# Patient Record
Sex: MALE | Race: WHITE | NOT HISPANIC OR LATINO | Employment: UNEMPLOYED | URBAN - METROPOLITAN AREA
[De-identification: names, ages, dates, MRNs, and addresses within clinical notes are randomized per-mention and may not be internally consistent; named-entity substitution may affect disease eponyms.]

---

## 2017-02-25 ENCOUNTER — HOSPITAL ENCOUNTER (EMERGENCY)
Facility: HOSPITAL | Age: 32
Discharge: HOME | End: 2017-02-25
Attending: EMERGENCY MEDICINE | Admitting: EMERGENCY MEDICINE

## 2017-02-25 VITALS
OXYGEN SATURATION: 98 % | HEART RATE: 87 BPM | RESPIRATION RATE: 16 BRPM | SYSTOLIC BLOOD PRESSURE: 149 MMHG | DIASTOLIC BLOOD PRESSURE: 95 MMHG | BODY MASS INDEX: 28.49 KG/M2 | TEMPERATURE: 99.1 F | WEIGHT: 215 LBS | HEIGHT: 73 IN

## 2017-02-25 DIAGNOSIS — L02.416 ABSCESS OF LEFT LEG: Primary | ICD-10-CM

## 2017-02-25 PROCEDURE — 87205 SMEAR GRAM STAIN: CPT | Performed by: EMERGENCY MEDICINE

## 2017-02-25 PROCEDURE — 87070 CULTURE OTHR SPECIMN AEROBIC: CPT | Performed by: EMERGENCY MEDICINE

## 2017-02-25 PROCEDURE — 99283 EMERGENCY DEPT VISIT LOW MDM: CPT

## 2017-02-25 PROCEDURE — 25010000002 ONDANSETRON PER 1 MG: Performed by: EMERGENCY MEDICINE

## 2017-02-25 PROCEDURE — 25010000002 HYDROMORPHONE PER 4 MG: Performed by: EMERGENCY MEDICINE

## 2017-02-25 PROCEDURE — 96374 THER/PROPH/DIAG INJ IV PUSH: CPT

## 2017-02-25 PROCEDURE — 87186 SC STD MICRODIL/AGAR DIL: CPT | Performed by: EMERGENCY MEDICINE

## 2017-02-25 PROCEDURE — 87147 CULTURE TYPE IMMUNOLOGIC: CPT | Performed by: EMERGENCY MEDICINE

## 2017-02-25 PROCEDURE — 96375 TX/PRO/DX INJ NEW DRUG ADDON: CPT

## 2017-02-25 RX ORDER — ONDANSETRON 2 MG/ML
4 INJECTION INTRAMUSCULAR; INTRAVENOUS ONCE
Status: COMPLETED | OUTPATIENT
Start: 2017-02-25 | End: 2017-02-25

## 2017-02-25 RX ORDER — SODIUM CHLORIDE 0.9 % (FLUSH) 0.9 %
10 SYRINGE (ML) INJECTION AS NEEDED
Status: DISCONTINUED | OUTPATIENT
Start: 2017-02-25 | End: 2017-02-25 | Stop reason: HOSPADM

## 2017-02-25 RX ORDER — SULFAMETHOXAZOLE AND TRIMETHOPRIM 800; 160 MG/1; MG/1
1 TABLET ORAL 2 TIMES DAILY
Qty: 14 TABLET | Refills: 0 | Status: SHIPPED | OUTPATIENT
Start: 2017-02-25 | End: 2017-03-08

## 2017-02-25 RX ORDER — HYDROCODONE BITARTRATE AND ACETAMINOPHEN 5; 325 MG/1; MG/1
1 TABLET ORAL EVERY 6 HOURS PRN
Qty: 15 TABLET | Refills: 0 | Status: SHIPPED | OUTPATIENT
Start: 2017-02-25 | End: 2017-03-08

## 2017-02-25 RX ADMIN — ONDANSETRON 4 MG: 2 INJECTION INTRAMUSCULAR; INTRAVENOUS at 16:27

## 2017-02-25 RX ADMIN — HYDROMORPHONE HYDROCHLORIDE 1 MG: 1 INJECTION, SOLUTION INTRAMUSCULAR; INTRAVENOUS; SUBCUTANEOUS at 16:30

## 2017-02-27 LAB
BACTERIA SPEC AEROBE CULT: ABNORMAL
GRAM STN SPEC: ABNORMAL
GRAM STN SPEC: ABNORMAL

## 2017-02-28 ENCOUNTER — HOSPITAL ENCOUNTER (EMERGENCY)
Facility: HOSPITAL | Age: 32
Discharge: HOME | End: 2017-02-28
Attending: EMERGENCY MEDICINE | Admitting: EMERGENCY MEDICINE

## 2017-02-28 VITALS
DIASTOLIC BLOOD PRESSURE: 86 MMHG | SYSTOLIC BLOOD PRESSURE: 145 MMHG | OXYGEN SATURATION: 96 % | HEIGHT: 73 IN | RESPIRATION RATE: 15 BRPM | TEMPERATURE: 98.3 F | WEIGHT: 215 LBS | HEART RATE: 78 BPM | BODY MASS INDEX: 28.49 KG/M2

## 2017-02-28 DIAGNOSIS — Z48.00 ABSCESS PACKING REMOVAL: ICD-10-CM

## 2017-02-28 DIAGNOSIS — Z51.89 WOUND CHECK, ABSCESS: ICD-10-CM

## 2017-02-28 DIAGNOSIS — L02.416 ABSCESS OF LEFT THIGH: Primary | ICD-10-CM

## 2017-02-28 PROCEDURE — 99283 EMERGENCY DEPT VISIT LOW MDM: CPT

## 2017-02-28 RX ORDER — IBUPROFEN 600 MG/1
600 TABLET ORAL EVERY 6 HOURS PRN
Qty: 24 TABLET | Refills: 0 | Status: SHIPPED | OUTPATIENT
Start: 2017-02-28 | End: 2017-03-01

## 2017-03-01 ENCOUNTER — OFFICE VISIT (OUTPATIENT)
Dept: SURGERY | Facility: CLINIC | Age: 32
End: 2017-03-01

## 2017-03-01 VITALS — HEART RATE: 97 BPM | WEIGHT: 215 LBS | OXYGEN SATURATION: 98 % | BODY MASS INDEX: 28.49 KG/M2 | HEIGHT: 73 IN

## 2017-03-01 DIAGNOSIS — L02.416 ABSCESS OF LEFT THIGH: Primary | ICD-10-CM

## 2017-03-01 PROCEDURE — 10060 I&D ABSCESS SIMPLE/SINGLE: CPT | Performed by: SURGERY

## 2017-03-01 NOTE — PROGRESS NOTES
Chief complaint: Left leg pain and swelling    History presenting illness: This nice 31-year-old gentleman noticed about 6 days ago some pain and swelling and erythema on his lateral aspect of his left thigh.  He presented to emergency room and fairly had an incision and drainage performed.  However over the last 34 days it hasn't really gotten better and he still has significant erythema and tenderness and so comes today for further evaluation.    Physical exam:  There is a small open incision which is healing nicely on the lateral aspect of his left thigh.  However just above this there is an area of induration and swelling and tenderness which is approximately 5 x 4 cm and although I cannot feel any distinct fluctuance and appears to me that there is likely to be an undrained space above it.    Assessment and plan abscess left thigh, incompletely drained.  I recommended further incision and drainage to the patient here in the office and he agreed to proceed.      Procedure note:    Preoperative diagnosis:  Left thigh abscess    Postoperative diagnosis: Same    Procedure performed: Incision and drainage of simple abscess on left thigh    Surgeon: Kamlesh Maldonado M.D.    Anesthesia: Local    Description of procedure:  After obtaining informed consent, patient brought to the procedure room and his leg was sterilely prepped and draped.  I infiltrated the area with a mixture of lidocaine and Marcaine and then used a #11 blade to incise through the area of maximal induration and immediately got some pus back.  I extended this incision all the way down to the area of his previous incision for a space about 2 cm giving him a total incision length of around 3 cm.  Some pus did come out.  I have no doubt that it was the same MRSA that he grew before and so a new culture was not sent.  I irrigated the wound with some peroxide and then dressed with a wet-to-dry normal saline dressing.  I told him he should continue his  antibiotics and follow up with me in the office next week.    Kamlesh Maldonado MD  General and Endoscopic Surgery  [facility]    [address], [address]  [place], [place], [address]  P: [phone]  F: [phone]

## 2017-03-08 ENCOUNTER — OFFICE VISIT (OUTPATIENT)
Dept: SURGERY | Facility: CLINIC | Age: 32
End: 2017-03-08

## 2017-03-08 DIAGNOSIS — L02.416 CUTANEOUS ABSCESS OF LEFT LOWER EXTREMITY: Primary | ICD-10-CM

## 2017-03-08 PROCEDURE — 99024 POSTOP FOLLOW-UP VISIT: CPT | Performed by: SURGERY

## 2017-03-08 NOTE — PROGRESS NOTES
Follow-up incision and drainage of left thigh abscess    Subjective:  Patient has minimal pain in this and overall feels better.    Objective:  Wound is examined.  No further erythema.  It has been drained nicely and there is beginnings of some nice granulation tissue.  No signs of any remaining infection.    Assessment and plan:  Status post drainage of anterior thigh abscess.  Patient has completed his antibiotics.  I've asked him to continue with twice daily normal saline dressing changes until nothing to be packed in the wound, and at that time I think that daily dry dressings over the wound until it is scabbed over nicely would be appropriate.  No indication for any further antibiotics.  Follow up with me as needed.    Kamlesh Maldonado MD  General and Endoscopic Surgery  [facility]    [address], [address]  [place], [place], [address]  P: [phone]  F: [phone]

## 2017-03-13 ENCOUNTER — OFFICE VISIT (OUTPATIENT)
Dept: SURGERY | Facility: CLINIC | Age: 32
End: 2017-03-13

## 2017-03-13 DIAGNOSIS — L02.416 CUTANEOUS ABSCESS OF LEFT LOWER EXTREMITY: Primary | ICD-10-CM

## 2017-03-13 PROCEDURE — 99024 POSTOP FOLLOW-UP VISIT: CPT | Performed by: SURGERY

## 2017-03-13 NOTE — PROGRESS NOTES
Follow-up anterior thigh wound    Subjective:  Patient says he is at work and began to have some more swelling and sensation of fever.  He took some photos of the wound which showed what looked like some fibrinous debris as well.    Objective:  There is no cellulitis on his leg.  The wound bed is quite clean.    Assessment and plan:  Cutaneous abscess of the thigh status post drainage.  I recommended that he continue with twice daily peroxide dressing changes.  He may follow-up as needed.    Kamlesh Maldonado MD  General and Endoscopic Surgery  [facility]    [address] [place] [address], [address]  [place], [place], [address]  P: [phone]  F: [phone]

## 2017-06-09 ENCOUNTER — HOSPITAL ENCOUNTER (EMERGENCY)
Facility: HOSPITAL | Age: 32
Discharge: HOME | End: 2017-06-09
Attending: EMERGENCY MEDICINE | Admitting: EMERGENCY MEDICINE

## 2017-06-09 ENCOUNTER — APPOINTMENT (OUTPATIENT)
Dept: GENERAL RADIOLOGY | Facility: HOSPITAL | Age: 32
End: 2017-06-09

## 2017-06-09 VITALS
TEMPERATURE: 97.7 F | RESPIRATION RATE: 16 BRPM | OXYGEN SATURATION: 98 % | DIASTOLIC BLOOD PRESSURE: 92 MMHG | BODY MASS INDEX: 29.16 KG/M2 | HEIGHT: 73 IN | HEART RATE: 65 BPM | SYSTOLIC BLOOD PRESSURE: 141 MMHG | WEIGHT: 220 LBS

## 2017-06-09 DIAGNOSIS — S63.502A WRIST SPRAIN, LEFT, INITIAL ENCOUNTER: ICD-10-CM

## 2017-06-09 DIAGNOSIS — S62.367A CLOSED NONDISPLACED FRACTURE OF NECK OF FIFTH METACARPAL BONE OF LEFT HAND, INITIAL ENCOUNTER: Primary | ICD-10-CM

## 2017-06-09 PROCEDURE — 73110 X-RAY EXAM OF WRIST: CPT

## 2017-06-09 PROCEDURE — 73130 X-RAY EXAM OF HAND: CPT

## 2017-06-09 PROCEDURE — 99282 EMERGENCY DEPT VISIT SF MDM: CPT

## 2017-06-09 RX ORDER — HYDROCODONE BITARTRATE AND ACETAMINOPHEN 5; 325 MG/1; MG/1
1 TABLET ORAL EVERY 6 HOURS PRN
Qty: 16 TABLET | Refills: 0 | Status: SHIPPED | OUTPATIENT
Start: 2017-06-09 | End: 2018-09-04

## 2017-06-09 NOTE — ED NOTES
"Pt fell \"[facility]\" and c/o right wrist pain that radiates into right elbow.      Keyanna Hagan RN  06/09/17 1007    "

## 2017-06-09 NOTE — DISCHARGE INSTRUCTIONS
PLEASE READ AND REVIEW ALL DISCHARGE INSTRUCTIONS.     Please follow up with your primary care physician for any further evaluation/treatment and further management of your blood pressure.     Recheck in emergency department for any worsening and/or concerning symptoms.     Take all prescribed medicine as written and continue chronic medication.    Keep splint in place until follow up with Dr. Gallego.  Call today to schedule appointment.     Take pain medicine as needed.    DO NOT DRIVE WHILE TAKING PAIN MEDICINE.

## 2017-06-09 NOTE — ED TRIAGE NOTES
Pt fell off back porch memorial day and still continues to have right wrist to elbow to upper arm pain

## 2017-06-09 NOTE — ED PROVIDER NOTES
Pt presents to the ED complaining of right hand and wrist pain s/p mechanical fall 1.5 weeks ago. On exam, the pt has tenderness to palpation along the lateral right hand. I agree with the plan to order R hand XR and R wrist XR prior to disposition.    I supervised care provided by the midlevel provider.    We have discussed this patient's history, physical exam, and treatment plan.   I have reviewed the note and personally saw and examined the patient and agree with the plan of care.    Documentation assistance provided by eren Bullock for Dr. Squires.  Information recorded by the nuhae was done at my direction and has been verified and validated by me.       Ignacia Bullock  06/09/17 [id number]       Martín Squires MD  06/09/17 7926

## 2018-09-04 ENCOUNTER — HOSPITAL ENCOUNTER (EMERGENCY)
Facility: HOSPITAL | Age: 33
Discharge: HOME | End: 2018-09-04
Attending: EMERGENCY MEDICINE | Admitting: EMERGENCY MEDICINE

## 2018-09-04 VITALS
DIASTOLIC BLOOD PRESSURE: 96 MMHG | TEMPERATURE: 98.6 F | BODY MASS INDEX: 28.49 KG/M2 | RESPIRATION RATE: 18 BRPM | HEIGHT: 73 IN | HEART RATE: 69 BPM | SYSTOLIC BLOOD PRESSURE: 138 MMHG | WEIGHT: 215 LBS | OXYGEN SATURATION: 99 %

## 2018-09-04 DIAGNOSIS — H61.22 IMPACTED CERUMEN OF LEFT EAR: Primary | ICD-10-CM

## 2018-09-04 PROCEDURE — 99283 EMERGENCY DEPT VISIT LOW MDM: CPT

## 2018-09-04 NOTE — ED PROVIDER NOTES
" EMERGENCY DEPARTMENT ENCOUNTER    CHIEF COMPLAINT  Chief Complaint: L ear pain   History given by: Pt  History limited by: None  Room Number: 33/33  PMD: Provider, No Known      HPI:  Pt is a 32 y.o. male who presents complaining of \"throbbing\" L ear pain and dizziness. Pt states that 6 months ago he had a hearing test and a nurse \"saw something in his ear\". Pt has tried using hydrogen peroxide in ear with no relief. Pt denies fever, chills, cough, sore throat, nausea, and vomiting.     Duration:  Worsening over 6 months  Onset: gradual  Timing: constant  Location: L ear  Quality: \"throbbing\" pain  Intensity/Severity: moderate  Progression: worsening  Associated Symptoms: dizziness  Aggravating Factors: none stated  Alleviating Factors: none  Previous Episodes: none  Treatment before arrival: Pt has used hydrogen peroxide with no relief.     PAST MEDICAL HISTORY  Active Ambulatory Problems     Diagnosis Date Noted   • No Active Ambulatory Problems     Resolved Ambulatory Problems     Diagnosis Date Noted   • No Resolved Ambulatory Problems     No Additional Past Medical History       PAST SURGICAL HISTORY  Past Surgical History:   Procedure Laterality Date   • ABSCESS DRAINAGE Left 03/01/2017    Incision and drainage of simple abscess on left thigh-Dr. Kamlesh Maldonado       FAMILY HISTORY  History reviewed. No pertinent family history.    SOCIAL HISTORY  Social History     Social History   • Marital status: Single     Spouse name: N/A   • Number of children: N/A   • Years of education: N/A     Occupational History   • Not on file.     Social History Main Topics   • Smoking status: Current Every Day Smoker     Packs/day: 0.50     Years: 1.00   • Smokeless tobacco: Not on file   • Alcohol use No   • Drug use: No   • Sexual activity: Defer     Other Topics Concern   • Not on file     Social History Narrative   • No narrative on file       ALLERGIES  Patient has no known allergies.    REVIEW OF SYSTEMS  Review of " Systems   Constitutional: Negative for activity change, appetite change, chills and fever.   HENT: Positive for ear pain (L). Negative for congestion and sore throat.    Eyes: Negative.    Respiratory: Negative for cough and shortness of breath.    Cardiovascular: Negative for chest pain and leg swelling.   Gastrointestinal: Negative for abdominal pain, diarrhea, nausea and vomiting.   Endocrine: Negative.    Genitourinary: Negative for decreased urine volume and dysuria.   Musculoskeletal: Negative for neck pain.   Skin: Negative for rash and wound.   Allergic/Immunologic: Negative.    Neurological: Positive for dizziness. Negative for weakness, numbness and headaches.   Hematological: Negative.    Psychiatric/Behavioral: Negative.    All other systems reviewed and are negative.      PHYSICAL EXAM  ED Triage Vitals   Temp Heart Rate Resp BP SpO2   09/04/18 1040 09/04/18 1040 09/04/18 1123 09/04/18 1123 09/04/18 1040   98.6 °F (37 °C) 78 16 130/83 98 %      Temp src Heart Rate Source Patient Position BP Location FiO2 (%)   09/04/18 1040 09/04/18 1123 -- 09/04/18 1123 --   Tympanic Monitor  Right arm        Physical Exam   Constitutional: No distress.   HENT:   Head: Normocephalic and atraumatic.   Right Ear: Tympanic membrane and ear canal normal.   Mouth/Throat: Oropharynx is clear and moist.   Wax plug covering L TM   Eyes:   Unremarkable   Neck: Normal range of motion. Neck supple.   No lymphadenopathy    Cardiovascular: Normal rate and regular rhythm.    Pulmonary/Chest: Breath sounds normal. No respiratory distress.   Abdominal: There is no tenderness.   Musculoskeletal: He exhibits no edema or tenderness.   Neurological: He is alert.   Skin: No rash noted.   Nursing note and vitals reviewed.      PROCEDURES  Procedures      PROGRESS AND CONSULTS   12:08 PM  Ear wax removal and Debrox ordered for wax plug removal.     1:39 PM  Rechecked pt who is resting in NAD. Attempted Debrox and warm water irrigation  without success. Discussed plan to discharge with referral to ENT. Pt understands and agrees with the plan, all questions answered.      MEDICAL DECISION MAKING  Results were reviewed/discussed with the patient and they were also made aware of online access. Pt also made aware that some labs, such as cultures, will not be resulted during ER visit and follow up with PMD is necessary.     MDM       DIAGNOSIS  Final diagnoses:   Impacted cerumen of left ear       DISPOSITION  DISCHARGE    Patient discharged in stable condition.    Reviewed implications of results, diagnosis, meds, responsibility to follow up, warning signs and symptoms of possible worsening, potential complications and reasons to return to ER.    Patient/Family voiced understanding of above instructions.    Discussed plan for discharge, as there is no emergent indication for admission. Patient referred to primary care provider for BP management due to today's BP. Pt/family is agreeable and understands need for follow up and repeat testing.  Pt is aware that discharge does not mean that nothing is wrong but it indicates no emergency is present that requires admission and they must continue care with follow-up as given below or physician of their choice.     FOLLOW-UP  Jermaine Yao MD  [address] [address]  [phone]    Schedule an appointment as soon as possible for a visit            Medication List      Stop    HYDROcodone-acetaminophen 5-325 MG per tablet  Commonly known as:  NORCO              Latest Documented Vital Signs:  As of 1:53 PM  BP- 138/91 HR- 70 Temp- 98.6 °F (37 °C) (Tympanic) O2 sat- 97%    --  Documentation assistance provided by eren Koenig for Dr. Doss.  Information recorded by the scrmatthew was done at my direction and has been verified and validated by me.         Crista Koenig  09/04/18 5633       Parish Doss MD  09/04/18 5228

## 2018-09-21 ENCOUNTER — OFFICE VISIT CONVERTED (OUTPATIENT)
Dept: FAMILY MEDICINE CLINIC | Age: 33
End: 2018-09-21
Attending: NURSE PRACTITIONER

## 2019-03-19 ENCOUNTER — HOSPITAL ENCOUNTER (OUTPATIENT)
Dept: OTHER | Facility: HOSPITAL | Age: 34
Discharge: HOME | End: 2019-03-19
Attending: NURSE PRACTITIONER

## 2019-03-19 ENCOUNTER — OFFICE VISIT CONVERTED (OUTPATIENT)
Dept: FAMILY MEDICINE CLINIC | Age: 34
End: 2019-03-19
Attending: NURSE PRACTITIONER

## 2019-03-19 LAB
ALBUMIN SERPL-MCNC: 4.4 G/DL (ref 3.5–5)
ALBUMIN/GLOB SERPL: 1.2 {RATIO} (ref 1.4–2.6)
ALP SERPL-CCNC: 79 U/L (ref 53–128)
ALT SERPL-CCNC: 95 U/L (ref 10–40)
ANION GAP SERPL CALC-SCNC: 17 MMOL/L (ref 8–19)
AST SERPL-CCNC: 48 U/L (ref 15–50)
BILIRUB SERPL-MCNC: 0.27 MG/DL (ref 0.2–1.3)
BUN SERPL-MCNC: 14 MG/DL (ref 5–25)
BUN/CREAT SERPL: 15 {RATIO} (ref 6–20)
CALCIUM SERPL-MCNC: 9.4 MG/DL (ref 8.7–10.4)
CHLORIDE SERPL-SCNC: 101 MMOL/L (ref 99–111)
CONV CO2: 24 MMOL/L (ref 22–32)
CONV TOTAL PROTEIN: 8 G/DL (ref 6.3–8.2)
CREAT UR-MCNC: 0.94 MG/DL (ref 0.7–1.2)
GFR SERPLBLD BASED ON 1.73 SQ M-ARVRAT: >60 ML/MIN/{1.73_M2}
GLOBULIN UR ELPH-MCNC: 3.6 G/DL (ref 2–3.5)
GLUCOSE SERPL-MCNC: 101 MG/DL (ref 70–99)
OSMOLALITY SERPL CALC.SUM OF ELEC: 287 MOSM/KG (ref 273–304)
POTASSIUM SERPL-SCNC: 4.2 MMOL/L (ref 3.5–5.3)
SODIUM SERPL-SCNC: 138 MMOL/L (ref 135–147)

## 2019-03-22 ENCOUNTER — OFFICE VISIT CONVERTED (OUTPATIENT)
Dept: PODIATRY | Facility: CLINIC | Age: 34
End: 2019-03-22
Attending: PODIATRIST

## 2019-03-28 ENCOUNTER — HOSPITAL ENCOUNTER (OUTPATIENT)
Dept: OTHER | Facility: HOSPITAL | Age: 34
Discharge: HOME | End: 2019-03-28
Attending: NURSE PRACTITIONER

## 2019-04-04 ENCOUNTER — OFFICE VISIT CONVERTED (OUTPATIENT)
Dept: PODIATRY | Facility: CLINIC | Age: 34
End: 2019-04-04
Attending: PODIATRIST

## 2019-10-23 ENCOUNTER — OFFICE VISIT CONVERTED (OUTPATIENT)
Dept: FAMILY MEDICINE CLINIC | Age: 34
End: 2019-10-23
Attending: NURSE PRACTITIONER

## 2019-11-26 ENCOUNTER — HOSPITAL ENCOUNTER (OUTPATIENT)
Dept: OTHER | Facility: HOSPITAL | Age: 34
Discharge: HOME | End: 2019-11-26
Attending: NURSE PRACTITIONER

## 2019-11-26 ENCOUNTER — OFFICE VISIT CONVERTED (OUTPATIENT)
Dept: FAMILY MEDICINE CLINIC | Age: 34
End: 2019-11-26
Attending: NURSE PRACTITIONER

## 2019-12-06 ENCOUNTER — HOSPITAL ENCOUNTER (OUTPATIENT)
Dept: OTHER | Facility: HOSPITAL | Age: 34
Discharge: HOME | End: 2019-12-06
Attending: NURSE PRACTITIONER

## 2019-12-20 ENCOUNTER — OFFICE VISIT (OUTPATIENT)
Dept: ORTHOPEDIC SURGERY | Facility: CLINIC | Age: 34
End: 2019-12-20

## 2019-12-20 VITALS — HEIGHT: 73 IN | TEMPERATURE: 97.9 F | BODY MASS INDEX: 30.48 KG/M2 | WEIGHT: 230 LBS

## 2019-12-20 DIAGNOSIS — M25.511 ACUTE PAIN OF RIGHT SHOULDER: Primary | ICD-10-CM

## 2019-12-20 PROCEDURE — 99203 OFFICE O/P NEW LOW 30 MIN: CPT | Performed by: ORTHOPAEDIC SURGERY

## 2019-12-20 PROCEDURE — 20610 DRAIN/INJ JOINT/BURSA W/O US: CPT | Performed by: ORTHOPAEDIC SURGERY

## 2019-12-20 PROCEDURE — 73030 X-RAY EXAM OF SHOULDER: CPT | Performed by: ORTHOPAEDIC SURGERY

## 2019-12-20 RX ORDER — IBUPROFEN 200 MG
200 TABLET ORAL EVERY 6 HOURS PRN
COMMUNITY
End: 2021-08-27

## 2019-12-20 RX ORDER — CYCLOBENZAPRINE HCL 5 MG
5 TABLET ORAL 3 TIMES DAILY PRN
COMMUNITY
End: 2021-08-27

## 2019-12-20 RX ADMIN — METHYLPREDNISOLONE ACETATE 160 MG: 80 INJECTION, SUSPENSION INTRA-ARTICULAR; INTRALESIONAL; INTRAMUSCULAR; SOFT TISSUE at 09:35
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
## 2019-12-24 RX ORDER — METHYLPREDNISOLONE ACETATE 80 MG/ML
160 INJECTION, SUSPENSION INTRA-ARTICULAR; INTRALESIONAL; INTRAMUSCULAR; SOFT TISSUE
Status: COMPLETED | OUTPATIENT
Start: 2019-12-20 | End: 2019-12-20

## 2020-01-02 ENCOUNTER — TELEPHONE (OUTPATIENT)
Dept: ORTHOPEDIC SURGERY | Facility: CLINIC | Age: 35
End: 2020-01-02

## 2020-01-02 NOTE — TELEPHONE ENCOUNTER
Spoke with pt and explained to him that the injections doesn't always work for everyone. He wants to know if we can work him in sooner. If we can can you please call him back and set that appt up. Thank you.

## 2020-01-02 NOTE — TELEPHONE ENCOUNTER
Patient says that the cortisone injection hasn't helped the right shoulder, says he is still having a lot of pain.

## 2020-01-03 ENCOUNTER — OFFICE VISIT (OUTPATIENT)
Dept: ORTHOPEDIC SURGERY | Facility: CLINIC | Age: 35
End: 2020-01-03

## 2020-01-03 VITALS — TEMPERATURE: 98.3 F | HEIGHT: 73 IN | WEIGHT: 230 LBS | BODY MASS INDEX: 30.48 KG/M2

## 2020-01-03 DIAGNOSIS — M54.2 NECK AND SHOULDER PAIN: Primary | ICD-10-CM

## 2020-01-03 DIAGNOSIS — M25.519 NECK AND SHOULDER PAIN: Primary | ICD-10-CM

## 2020-01-03 PROCEDURE — 99213 OFFICE O/P EST LOW 20 MIN: CPT | Performed by: ORTHOPAEDIC SURGERY

## 2020-01-03 RX ORDER — IBUPROFEN 800 MG/1
800 TABLET ORAL 3 TIMES DAILY
Qty: 90 TABLET | Refills: 0 | Status: SHIPPED | OUTPATIENT
Start: 2020-01-03 | End: 2021-08-27

## 2020-01-03 RX ORDER — CYCLOBENZAPRINE HCL 10 MG
10 TABLET ORAL NIGHTLY PRN
Qty: 60 TABLET | Refills: 0 | Status: SHIPPED | OUTPATIENT
Start: 2020-01-03 | End: 2021-08-27

## 2020-01-05 NOTE — PROGRESS NOTES
"Patient:Sarthak Gamboa    YOB: 1985    Medical Record Number:[id number]    Chief Complaints: Follow-up regarding right shoulder pain    History of Present Illness:     34 y.o. male patient who presents for follow-up of his right shoulder.  He says that he is no better.  The shot did not help at all, even for a few minutes.  He admits that he never followed through on the therapy recommendation.  He feels like his symptoms are getting worse.  He reports that he is having pain shooting down the arm and occasionally into the back of his shoulder now.  He reports a new numbness and tingling and he is also having a \"heavy sensation\".  He has never had his neck worked up with the exception of x-rays which were taken in [place].  He says that the x-rays were reportedly negative.  Denies any lower extremity dysfunction.  Denies any weakness.    Allergies:No Known Allergies    Home Medications:    Current Outpatient Medications:   •  ibuprofen (ADVIL,MOTRIN) 200 MG tablet, Take 200 mg by mouth Every 6 (Six) Hours As Needed for Mild Pain ., Disp: , Rfl:   •  cyclobenzaprine (FLEXERIL) 10 MG tablet, Take 1 tablet by mouth At Night As Needed for Muscle Spasms., Disp: 60 tablet, Rfl: 0  •  cyclobenzaprine (FLEXERIL) 5 MG tablet, Take 5 mg by mouth 3 (Three) Times a Day As Needed for Muscle Spasms., Disp: , Rfl:   •  ibuprofen (ADVIL,MOTRIN) 800 MG tablet, Take 1 tablet by mouth 3 (Three) Times a Day., Disp: 90 tablet, Rfl: 0    History reviewed. No pertinent past medical history.    Past Surgical History:   Procedure Laterality Date   • ABSCESS DRAINAGE Left 03/01/2017    Incision and drainage of simple abscess on left thigh-Dr. Kamlesh Maldonado       Social History     Occupational History   • Not on file   Tobacco Use   • Smoking status: Current Every Day Smoker     Packs/day: 0.50     Years: 1.00     Pack years: 0.50   • Smokeless tobacco: Never Used   Substance and Sexual Activity   • Alcohol use: No   • Drug " "use: No   • Sexual activity: Defer      Social History     Social History Narrative   • Not on file       History reviewed. No pertinent family history.    Review of Systems:      Constitutional: Denies fever, shaking or chills   Eyes: Denies change in visual acuity   HEENT: Denies nasal congestion or sore throat   Respiratory: Denies cough or shortness of breath   Cardiovascular: Denies chest pain or edema  Endocrine: Denies tremors, palpitations, intolerance of heat or cold, polyuria, polydipsia.  GI: Denies abdominal pain, nausea, vomiting, bloody stools or diarrhea  : Denies frequency, urgency, incontinence, retention, or nocturia.  Musculoskeletal: Denies numbness, tingling or loss of motor function except as above  Integument: Denies rash, lesion or ulceration   Neurologic: Denies headache or focal weakness, deficits  Heme: Denies spontaneous or excessive bleeding, epistaxis, hematuria, melena, fatigue, enlarged or tender lymph nodes.      All other pertinent positives and negatives as noted above in HPI.    Physical Exam:34 y.o. male  Vitals:    01/03/20 1052   Temp: 98.3 °F (36.8 °C)   Weight: 104 kg (230 lb)   Height: 185.4 cm (73\")       General:  Patient is awake and alert.  Appears in no acute distress or discomfort.    Psych:  Affect and demeanor are appropriate.    Extremities: His neck and right shoulder are examined.  A Spurling's does reproduce some posterior shoulder pain today but no arm symptoms.  He is got good shoulder motion.  No focal areas of tenderness.  He does again have discomfort with Neer, Delatorre and active compression maneuvers.  He has good strength in his rotator cuff and deltoid.  He reports normal sensation throughout the arm and hand.    Imaging: No x-rays taken today    Assessment/Plan: Persistent right shoulder and arm symptoms of unclear etiology, possible cervical radiculopathy    I think we need to look at his neck at this point.  I recommended an MRI of the cervical " spine.  I told him I will call him with the results.  I agreed to give him prescriptions for ibuprofen and Flexeril to take as needed.  Risks of these medicines were discussed.    Dex Kirby MD    01/03/2020

## 2020-01-15 ENCOUNTER — HOSPITAL ENCOUNTER (OUTPATIENT)
Dept: MRI IMAGING | Facility: HOSPITAL | Age: 35
Discharge: HOME | End: 2020-01-15
Admitting: ORTHOPAEDIC SURGERY

## 2020-01-15 DIAGNOSIS — M25.519 NECK AND SHOULDER PAIN: ICD-10-CM

## 2020-01-15 DIAGNOSIS — M54.2 NECK AND SHOULDER PAIN: ICD-10-CM

## 2020-01-15 PROCEDURE — 72141 MRI NECK SPINE W/O DYE: CPT

## 2020-01-22 ENCOUNTER — TELEPHONE (OUTPATIENT)
Dept: ORTHOPEDIC SURGERY | Facility: CLINIC | Age: 35
End: 2020-01-22

## 2020-01-24 ENCOUNTER — TELEPHONE (OUTPATIENT)
Dept: ORTHOPEDIC SURGERY | Facility: CLINIC | Age: 35
End: 2020-01-24

## 2020-01-24 NOTE — TELEPHONE ENCOUNTER
I spoke with him.  We discussed his cervical spine MRI results.  It looks clean.  In talking with him on the phone, it sounds like more of his symptoms are localizing to the shoulder at this point.  He says that he has a demanding job at [employer] and it puts a lot of stress on his shoulders.  I told him I need to see him again to figure out what is going on here.  Previous impression had been that it was in transit to the shoulder.  At his last visit though, it seemed to be more neck.  I want to see him again in reevaluate.  I will have the office call him to set this up.

## 2020-01-27 NOTE — TELEPHONE ENCOUNTER
Patient returned call, appointment scheduled for him to see [facility] on 1/29.

## 2020-01-29 ENCOUNTER — OFFICE VISIT (OUTPATIENT)
Dept: ORTHOPEDIC SURGERY | Facility: CLINIC | Age: 35
End: 2020-01-29

## 2020-01-29 VITALS — BODY MASS INDEX: 31.41 KG/M2 | TEMPERATURE: 98.4 F | WEIGHT: 237 LBS | HEIGHT: 73 IN

## 2020-01-29 DIAGNOSIS — M54.2 NECK AND SHOULDER PAIN: Primary | ICD-10-CM

## 2020-01-29 DIAGNOSIS — M25.519 NECK AND SHOULDER PAIN: Primary | ICD-10-CM

## 2020-01-29 PROCEDURE — 99213 OFFICE O/P EST LOW 20 MIN: CPT | Performed by: ORTHOPAEDIC SURGERY

## 2020-01-30 NOTE — PROGRESS NOTES
Patient:Sarthak Gamboa    YOB: 1985    Medical Record Number:[id number]    Chief Complaints: Follow-up regarding neck and right shoulder pain    History of Present Illness:     34 y.o. male patient who presents for follow-up of his neck and right shoulder.  He says the neck is feeling a little better.  He continues to have the shoulder pain.  He did get the MRI of his neck and we discussed the results over the phone.  The study was negative.  He is going to be starting PT tomorrow.  He is continue to work but the pain has been a persistent issue.  Denies mechanical symptoms or instability.  Denies any numbness or tingling.    Allergies:No Known Allergies    Home Medications:    Current Outpatient Medications:   •  cyclobenzaprine (FLEXERIL) 10 MG tablet, Take 1 tablet by mouth At Night As Needed for Muscle Spasms., Disp: 60 tablet, Rfl: 0  •  cyclobenzaprine (FLEXERIL) 5 MG tablet, Take 5 mg by mouth 3 (Three) Times a Day As Needed for Muscle Spasms., Disp: , Rfl:   •  ibuprofen (ADVIL,MOTRIN) 200 MG tablet, Take 200 mg by mouth Every 6 (Six) Hours As Needed for Mild Pain ., Disp: , Rfl:   •  ibuprofen (ADVIL,MOTRIN) 800 MG tablet, Take 1 tablet by mouth 3 (Three) Times a Day., Disp: 90 tablet, Rfl: 0    History reviewed. No pertinent past medical history.    Past Surgical History:   Procedure Laterality Date   • ABSCESS DRAINAGE Left 03/01/2017    Incision and drainage of simple abscess on left thigh-Dr. Kamlesh Maldonado       Social History     Occupational History   • Not on file   Tobacco Use   • Smoking status: Current Every Day Smoker     Packs/day: 0.50     Years: 1.00     Pack years: 0.50   • Smokeless tobacco: Never Used   Substance and Sexual Activity   • Alcohol use: No   • Drug use: No   • Sexual activity: Defer      Social History     Social History Narrative   • Not on file       History reviewed. No pertinent family history.    Review of Systems:      Constitutional: Denies fever, shaking or  "chills   Eyes: Denies change in visual acuity   HEENT: Denies nasal congestion or sore throat   Respiratory: Denies cough or shortness of breath   Cardiovascular: Denies chest pain or edema  Endocrine: Denies tremors, palpitations, intolerance of heat or cold, polyuria, polydipsia.  GI: Denies abdominal pain, nausea, vomiting, bloody stools or diarrhea  : Denies frequency, urgency, incontinence, retention, or nocturia.  Musculoskeletal: Denies numbness, tingling or loss of motor function except as above  Integument: Denies rash, lesion or ulceration   Neurologic: Denies headache or focal weakness, deficits  Heme: Denies spontaneous or excessive bleeding, epistaxis, hematuria, melena, fatigue, enlarged or tender lymph nodes.      All other pertinent positives and negatives as noted above in HPI.    Physical Exam:34 y.o. male  Vitals:    01/29/20 1419   Temp: 98.4 °F (36.9 °C)   Weight: 108 kg (237 lb)   Height: 185.4 cm (73\")     General:  Patient is awake and alert.  Appears in no acute distress or discomfort.    Psych:  Affect and demeanor are appropriate.    Extremities: Right shoulder is briefly examined.  Skin is benign.  He does have some tenderness over the acromioclavicular joint.  Full motion.  Positive Delatorre and active compression maneuvers.  Seems to have good strength in his rotator cuff.    Imaging: MRI of the cervical spine is reviewed.  Study is negative.  Previous MRI of the shoulder is reviewed.  I do not have the report available at this time.  The images show mild subacromial/subdeltoid bursitis, some rotator cuff tendinopathy and acromioclavicular arthritis.    Assessment/Plan: Right shoulder bursitis and acromioclavicular arthritis    It seems like this is declaring as more of a shoulder problem.  I do not see anything on his previous MRI of the shoulder that would necessarily be an absolute indication for surgery.  I recommend that he try the therapy.  The injection did not really help all " that much so I told recommend repeating that but I do think it is worthwhile to give the therapy a try.  He can continue to work as he has.  I will schedule him to come back in a month and we will reevaluate.    Dex Kirby MD    01/29/2020

## 2020-02-20 ENCOUNTER — TELEPHONE (OUTPATIENT)
Dept: ORTHOPEDIC SURGERY | Facility: CLINIC | Age: 35
End: 2020-02-20

## 2020-02-20 ENCOUNTER — HOSPITAL ENCOUNTER (OUTPATIENT)
Dept: PHYSICAL THERAPY | Facility: CLINIC | Age: 35
Setting detail: RECURRING SERIES
Discharge: HOME | End: 2020-03-20
Attending: ORTHOPAEDIC SURGERY

## 2020-02-20 DIAGNOSIS — M25.511 ACUTE PAIN OF RIGHT SHOULDER: Primary | ICD-10-CM

## 2020-02-20 NOTE — TELEPHONE ENCOUNTER
PHYSICAL THERAPY SAYS THEY NEED ORDER TO BE ABLE TO HAVE OCCUPATIONAL THERAPY ON ORDER BECAUSE THEY HAVE A THERAPIST WHO IS CERTIFIED IN SHOULDERS BEFORE THEY CAN BRING PATIENT IN.  COULD YOU PLEASE PUT IN ORDER FOR THAT/DM

## 2020-03-02 ENCOUNTER — OFFICE VISIT (OUTPATIENT)
Dept: ORTHOPEDIC SURGERY | Facility: CLINIC | Age: 35
End: 2020-03-02

## 2020-03-02 VITALS — BODY MASS INDEX: 31.94 KG/M2 | WEIGHT: 241 LBS | HEIGHT: 73 IN | TEMPERATURE: 98 F

## 2020-03-02 DIAGNOSIS — IMO0002 BURSITIS/TENDONITIS, SHOULDER: Primary | ICD-10-CM

## 2020-03-02 PROCEDURE — 99213 OFFICE O/P EST LOW 20 MIN: CPT | Performed by: ORTHOPAEDIC SURGERY

## 2020-03-03 NOTE — PROGRESS NOTES
Patient:Sarthak Gamboa    YOB: 1985    Medical Record Number:[id number]    Chief Complaints: Right shoulder pain    History of Present Illness:     34 y.o. male patient who presents for follow-up of his right shoulder.  He is no better.  Still having a lot of pain along the side of his shoulder.  The injection did not help.  The therapy did not help.    Allergies:No Known Allergies    Home Medications:    Current Outpatient Medications:   •  cyclobenzaprine (FLEXERIL) 10 MG tablet, Take 1 tablet by mouth At Night As Needed for Muscle Spasms., Disp: 60 tablet, Rfl: 0  •  cyclobenzaprine (FLEXERIL) 5 MG tablet, Take 5 mg by mouth 3 (Three) Times a Day As Needed for Muscle Spasms., Disp: , Rfl:   •  ibuprofen (ADVIL,MOTRIN) 200 MG tablet, Take 200 mg by mouth Every 6 (Six) Hours As Needed for Mild Pain ., Disp: , Rfl:   •  ibuprofen (ADVIL,MOTRIN) 800 MG tablet, Take 1 tablet by mouth 3 (Three) Times a Day., Disp: 90 tablet, Rfl: 0    History reviewed. No pertinent past medical history.    Past Surgical History:   Procedure Laterality Date   • ABSCESS DRAINAGE Left 03/01/2017    Incision and drainage of simple abscess on left thigh-Dr. Kamlesh Maldonado       Social History     Occupational History   • Not on file   Tobacco Use   • Smoking status: Current Every Day Smoker     Packs/day: 0.50     Years: 1.00     Pack years: 0.50   • Smokeless tobacco: Never Used   Substance and Sexual Activity   • Alcohol use: No   • Drug use: No   • Sexual activity: Defer      Social History     Social History Narrative   • Not on file       History reviewed. No pertinent family history.    Review of Systems:      Constitutional: Denies fever, shaking or chills   Eyes: Denies change in visual acuity   HEENT: Denies nasal congestion or sore throat   Respiratory: Denies cough or shortness of breath   Cardiovascular: Denies chest pain or edema  Endocrine: Denies tremors, palpitations, intolerance of heat or cold, polyuria,  "polydipsia.  GI: Denies abdominal pain, nausea, vomiting, bloody stools or diarrhea  : Denies frequency, urgency, incontinence, retention, or nocturia.  Musculoskeletal: Denies numbness, tingling or loss of motor function except as above  Integument: Denies rash, lesion or ulceration   Neurologic: Denies headache or focal weakness, deficits  Heme: Denies spontaneous or excessive bleeding, epistaxis, hematuria, melena, fatigue, enlarged or tender lymph nodes.      All other pertinent positives and negatives as noted above in HPI.    Physical Exam:34 y.o. male  Vitals:    03/02/20 1325   Temp: 98 °F (36.7 °C)   TempSrc: Temporal   Weight: 109 kg (241 lb)   Height: 185.4 cm (73\")       General:  Patient is awake and alert.  Appears in no acute distress or discomfort.    Psych:  Affect and demeanor are appropriate.    Extremities: Right shoulder is again examined.  His exam is unchanged.  He is a little tender over the acromioclavicular joint.  He has good motion.  He has positive impingement maneuvers and a positive active compression maneuver.  Positive [place]'s as well.  Again, he has good strength in his rotator cuff.  Elevation in the scapular plane is mildly uncomfortable.    Imaging: I again reviewed the reports of the MRI of his cervical spine and the MRI of his right shoulder.  The cervical spine MRI is negative.  The MRI of his right shoulder shows some mild bursitis, mild acromioclavicular arthritis, rotator cuff tendinopathy and perhaps a subtle, very low-grade partial-thickness supraspinatus tear.    Assessment/Plan: Chronic right shoulder pain of unclear etiology in patient with exam suggestive of component of impingement and acromioclavicular arthritis    I want to send him for an MR arthrogram.  I told him I will call him with the results.  If that study is negative, we may need to consider a referral to pain management.    Dex Kirby MD    03/02/2020    "

## 2020-03-13 ENCOUNTER — HOSPITAL ENCOUNTER (OUTPATIENT)
Dept: MRI IMAGING | Facility: HOSPITAL | Age: 35
Discharge: HOME | End: 2020-03-13

## 2020-03-13 ENCOUNTER — HOSPITAL ENCOUNTER (OUTPATIENT)
Dept: GENERAL RADIOLOGY | Facility: HOSPITAL | Age: 35
Discharge: HOME | End: 2020-03-13
Admitting: ORTHOPAEDIC SURGERY

## 2020-03-13 DIAGNOSIS — IMO0002 BURSITIS/TENDONITIS, SHOULDER: ICD-10-CM

## 2020-03-13 PROCEDURE — 77002 NEEDLE LOCALIZATION BY XRAY: CPT

## 2020-03-13 PROCEDURE — 73222 MRI JOINT UPR EXTREM W/DYE: CPT

## 2020-03-13 PROCEDURE — 25010000003 LIDOCAINE 1 % SOLUTION: Performed by: ORTHOPAEDIC SURGERY

## 2020-03-13 PROCEDURE — A9577 INJ MULTIHANCE: HCPCS | Performed by: ORTHOPAEDIC SURGERY

## 2020-03-13 PROCEDURE — 0 GADOBENATE DIMEGLUMINE 529 MG/ML SOLUTION: Performed by: ORTHOPAEDIC SURGERY

## 2020-03-13 PROCEDURE — 25010000002 IOPAMIDOL 61 % SOLUTION: Performed by: ORTHOPAEDIC SURGERY

## 2020-03-13 RX ORDER — LIDOCAINE HYDROCHLORIDE 10 MG/ML
10 INJECTION, SOLUTION INFILTRATION; PERINEURAL ONCE
Status: COMPLETED | OUTPATIENT
Start: 2020-03-13 | End: 2020-03-13

## 2020-03-13 RX ADMIN — IOPAMIDOL 5 ML: 612 INJECTION, SOLUTION INTRAVENOUS at 11:40

## 2020-03-13 RX ADMIN — GADOBENATE DIMEGLUMINE 0.1 ML: 529 INJECTION, SOLUTION INTRAVENOUS at 11:41

## 2020-03-13 RX ADMIN — LIDOCAINE HYDROCHLORIDE 2 ML: 10 INJECTION, SOLUTION INFILTRATION; PERINEURAL at 11:36

## 2020-03-18 ENCOUNTER — TELEPHONE (OUTPATIENT)
Dept: ORTHOPEDIC SURGERY | Facility: CLINIC | Age: 35
End: 2020-03-18

## 2020-03-18 NOTE — TELEPHONE ENCOUNTER
I spoke to Mr. Gamboa and provided him with the right shoulder MR arthrogram as reviewed by Dr. Kirby.  The study was negative for any pathology.  He reports he continues to have pain at frequently radiates down into his hand.  Reports focal tenderness over the anterior portion of the shoulder.  Dr. Kirby recommends he continue physical therapy and follow-up in clinic in 6 weeks.  He acknowledged understanding of all we discussed and agrees with this plan.

## 2020-04-29 ENCOUNTER — TELEMEDICINE (OUTPATIENT)
Dept: ORTHOPEDIC SURGERY | Facility: CLINIC | Age: 35
End: 2020-04-29

## 2020-04-29 DIAGNOSIS — M25.511 CHRONIC RIGHT SHOULDER PAIN: ICD-10-CM

## 2020-04-29 DIAGNOSIS — G89.29 CHRONIC RIGHT SHOULDER PAIN: ICD-10-CM

## 2020-04-29 DIAGNOSIS — R20.0 NUMBNESS AND TINGLING OF RIGHT ARM: Primary | ICD-10-CM

## 2020-04-29 DIAGNOSIS — R20.2 NUMBNESS AND TINGLING OF RIGHT ARM: Primary | ICD-10-CM

## 2020-04-29 PROCEDURE — 99212 OFFICE O/P EST SF 10 MIN: CPT | Performed by: ORTHOPAEDIC SURGERY

## 2020-04-29 NOTE — PROGRESS NOTES
This visit was conducted via video with the patient's permission.      Chief complaint: Right arm and shoulder pain    Mr. Gamboa is seen for his right arm and shoulder.  He says that he is no better.  He had an MRI of the shoulder which was negative.  He had an MRI the neck which was also negative.  He tells me that in addition to the pain, he is now having numbness and tingling radiating down the arm.  He denies any weakness.  He reports that he is still able to move and use the arm normally.  He denies any lower extremity dysfunction as well as any bowel or bladder dysfunction.    Over the video, he did demonstrate that he seems to be able to move his shoulder well.  He reports some discomfort when reaching overhead.    Assessment: Persistent right arm and shoulder pain of unclear etiology with new complaint of numbness and tingling    Plan: This sounds like a neurologic issue.  I am going to set him up for nerve testing.  Currently, I am at a loss to explain his ongoing symptoms.  We will see what the nerve test shows and then I will reach back out to him.

## 2020-05-01 ENCOUNTER — TELEPHONE (OUTPATIENT)
Dept: ORTHOPEDIC SURGERY | Facility: CLINIC | Age: 35
End: 2020-05-01

## 2020-05-01 NOTE — TELEPHONE ENCOUNTER
Patient was referred to Dr. Alegre's office for EMG & Nerve conduction test.  At this time they are only seeing urgent cases.  Patient doesn't want to go to [facility].  Is this urgent or can he wait till [facility]'s office opens?

## 2020-07-27 ENCOUNTER — HOSPITAL ENCOUNTER (OUTPATIENT)
Dept: INFUSION THERAPY | Facility: HOSPITAL | Age: 35
Discharge: HOME | End: 2020-07-27
Admitting: PSYCHIATRY & NEUROLOGY

## 2020-07-27 DIAGNOSIS — R20.2 NUMBNESS AND TINGLING OF RIGHT ARM: ICD-10-CM

## 2020-07-27 DIAGNOSIS — G89.29 CHRONIC RIGHT SHOULDER PAIN: Primary | ICD-10-CM

## 2020-07-27 DIAGNOSIS — R20.0 NUMBNESS AND TINGLING OF RIGHT ARM: ICD-10-CM

## 2020-07-27 DIAGNOSIS — M25.511 CHRONIC RIGHT SHOULDER PAIN: Primary | ICD-10-CM

## 2020-07-27 PROCEDURE — 95910 NRV CNDJ TEST 7-8 STUDIES: CPT

## 2020-07-27 PROCEDURE — 95910 NRV CNDJ TEST 7-8 STUDIES: CPT | Performed by: PSYCHIATRY & NEUROLOGY

## 2020-07-27 PROCEDURE — 95886 MUSC TEST DONE W/N TEST COMP: CPT

## 2020-07-27 PROCEDURE — 95886 MUSC TEST DONE W/N TEST COMP: CPT | Performed by: PSYCHIATRY & NEUROLOGY
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
## 2021-05-15 VITALS
HEART RATE: 65 BPM | OXYGEN SATURATION: 99 % | WEIGHT: 241 LBS | DIASTOLIC BLOOD PRESSURE: 92 MMHG | SYSTOLIC BLOOD PRESSURE: 136 MMHG | BODY MASS INDEX: 31.94 KG/M2 | HEIGHT: 73 IN

## 2021-05-15 VITALS
OXYGEN SATURATION: 96 % | BODY MASS INDEX: 31.54 KG/M2 | HEIGHT: 73 IN | DIASTOLIC BLOOD PRESSURE: 77 MMHG | HEART RATE: 75 BPM | SYSTOLIC BLOOD PRESSURE: 136 MMHG | WEIGHT: 238 LBS
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
## 2021-05-18 NOTE — PROGRESS NOTES
Herson Gamboa 1985     Office/Outpatient Visit    Visit Date: Tue, Mar 19, 2019 05:48 pm    Provider: Stacy Cabrera N.P. (Assistant: Sarah Spurling, MA)    Location: [facility]        Electronically signed by Stacy Cabrera N.P. on  03/20/2019 09:09:21 AM                             SUBJECTIVE:        CC:     Mr. Gamboa is a 33 year old White male.  Pt is here today because ihe is having pain in both feet. He said it's something he has been dealing with for years, but it is getting a lot worse. He has been off work since last wednesday because it gets to where he can't walk.          HPI:         Patient to be evaluated for foot pain.  Today's visit is for evaluation of both feet.  The location of the discomfort is primarily the arch and heel.  The pain does not radiate.  The pain initially began 1 months ago.  No precipitating event or injury is identified.  He characterizes the pain as of moderate severity and aching.  He denies associated symptoms.  Other details: [facility]. xrays done and negative.  ibuprofen, ice, and shoe inserts have not helped.  has not been to work since last wednesday due to pain.  was advised to see podiatrist but since that office required $250 up front he did not go.  used vacation days for last week but needs work note for yesterday and today..      ROS:     CONSTITUTIONAL:  Negative for chills, fatigue, fever, and weight change.      CARDIOVASCULAR:  Negative for chest pain, palpitations, tachycardia, orthopnea, and edema.      RESPIRATORY:  Negative for cough, dyspnea, and hemoptysis.      MUSCULOSKELETAL:  Positive for bilateral foot pain.      NEUROLOGICAL:  Negative for dizziness, headaches, paresthesias, and weakness.      ENDOCRINE:  Negative for hair loss, heat/cold intolerance, polydipsia, and polyphagia.      PSYCHIATRIC:  Negative for anxiety, depression, and sleep disturbances.          PMH/FMH/SH:     Last Reviewed on  9/21/2018 04:21 PM by Stacy Cabrera    Past Medical History:     UNREMARKABLE     Hospitalizations: Never         Surgical History:         tooth extraction;         Family History:         Positive for Seizure Disorder ( mother ).      Positive for Type 2 Diabetes ( mat. GM ).          Social History:     Occupation: a factory     Marital Status: Single     Children: 3 children     Exercise: Primary form of exercise is weight lifting.   Frequency is 5 days per week.          Tobacco/Alcohol/Supplements:     Last Reviewed on 9/21/2018 04:05 PM by Diamante Kemp    Tobacco: Current Smoker: He currently smokes every day, 1-5 cigarettes per day.          Alcohol: Frequency: Just on weekends         Substance Abuse History:     NEGATIVE             Current Problems:     Mild hypertension     Tachycardia, unspecified     Hypertension     Anxiety     Dizziness     Screening for depression         Immunizations:     None        Allergies:     Last Reviewed on 9/21/2018 04:05 PM by Diamante Kemp      No Known Drug Allergies.         Current Medications:     Last Reviewed on 3/19/2019 05:52 PM by Spurling, Sarah C      No Known Medications.         OBJECTIVE:        Vitals:         Historical:     09/21/2018  BP:   131/74 mm Hg ( (right arm, , sitting, );)     09/21/2018  Wt:   229.1lbs        Current: 3/19/2019 5:54:52 PM    Ht:  6 ft, 1 in;  Wt: 238.6 lbs;  BMI: 31.5    T: 97.5 F (oral);  BP: 108/80 mm Hg (right arm, sitting);  P: 75 bpm (right arm (BP Cuff), sitting);  sCr: 0.95 mg/dL;  GFR: 121.37        Exams:     PHYSICAL EXAM:     GENERAL:  well developed and nourished; appropriately groomed; in no apparent distress;     RESPIRATORY: normal respiratory rate and pattern with no distress; normal breath sounds with no rales, rhonchi, wheezes or rubs;     CARDIOVASCULAR: normal rate; rhythm is regular;     Peripheral Pulses: dorsalis pedis: 2+ amplitude, no bruits; posterior tibial: 2+ amplitude, no bruits; no edema;      MUSCULOSKELETAL: pain with range of motion in: bilateral feet with wt bearing;     NEUROLOGIC: mental status: alert and oriented x 3; GROSSLY INTACT     PSYCHIATRIC:  appropriate affect and demeanor; normal speech pattern; grossly normal memory;         ASSESSMENT           729.5   M79.671   M79.672  Foot pain              DDx:     V77.1   Z13.1  Screening for diabetes mellitus              DDx:         ORDERS:         Meds Prescribed:       Meloxicam 15mg Tablet 1 tab daily  #30 (Thirty) tablet(s) Refills: 0         Lab Orders:       [id number]  [facility] Comp. Metabolic Panel  (Send-Out)                   PLAN: note for yesterday and today          Foot pain         obtain visit note and xray report from [employer] [place] for review.  if conservative measures are not helping he will need to see podiatrist.            Prescriptions:       Meloxicam 15mg Tablet 1 tab daily  #30 (Thirty) tablet(s) Refills: 0           Patient Education Handouts:       Plantar Fasciitis           Screening for diabetes mellitus     LABORATORY:  Labs ordered to be performed today include Comprehensive metabolic panel.            Orders:       [id number]  [facility] Comp. Metabolic Panel  (Send-Out)               CHARGE CAPTURE           **Please note: ICD descriptions below are intended for billing purposes only and may not represent clinical diagnoses**        Primary Diagnosis:         729.5 Foot pain            M79.671    Pain in right foot           M79.672    Pain in left foot              Orders:          [id number]   Office/outpatient visit; established patient, level 3  (In-House)           V77.1 Screening for diabetes mellitus            Z13.1    Encounter for screening for diabetes mellitus

## 2021-05-18 NOTE — PROGRESS NOTES
Herson Gamboa 1985     Office/Outpatient Visit    Visit Date: Fri, Sep 21, 2018 03:58 pm    Provider: Stacy Cabrera N.P. (Assistant: Diamante Kemp MA)    Location: [facility]        Electronically signed by Stacy Cabrera N.P. on  09/23/2018 08:25:12 PM                             SUBJECTIVE:        CC:     Mr. Gamboa is a 32 year old White male.  This is his first visit to the clinic.  He presents with left ear pain, ringing x 2 1/2 weeks.          HPI: Seen with [employer] student         PHQ-9 Depression Screening: Completed form scanned and in chart; Total Score 1 Alcohol Consumption Screening: Completed form scanned and in chart; Total Score 2     Was seen at [facility] and at [facility] ER. Was told to see ENT but the copay was $250 and couldn't afford this right now. Left ear still feels clogged and cannot get it clear.     ROS:     CONSTITUTIONAL:  Negative for chills and fever.      EYES:  Negative for eye drainage.      E/N/T:  Positive for ear pain and tinnitus.   Negative for nasal congestion or sore throat.      CARDIOVASCULAR:  Negative for chest pain and palpitations.      RESPIRATORY:  Negative for recent cough and dyspnea.      MUSCULOSKELETAL:  Negative for myalgias.      NEUROLOGICAL:  Negative for paresthesias and weakness.      PSYCHIATRIC:  Negative for anxiety and depression.          PMH/FMH/SH:     Last Reviewed on 9/21/2018 04:21 PM by Stacy Cabrera    Past Medical History:     UNREMARKABLE     Hospitalizations: Never         Surgical History:         tooth extraction;         Family History:         Positive for Seizure Disorder ( mother ).      Positive for Type 2 Diabetes ( mat. GM ).          Social History:     Occupation: a factory     Marital Status: Single     Children: 3 children     Exercise: Primary form of exercise is weight lifting.   Frequency is 5 days per week.          Tobacco/Alcohol/Supplements:     Last Reviewed on 9/21/2018 04:05 PM by Justo  Diamante    Tobacco: Current Smoker: He currently smokes every day, 1-5 cigarettes per day.          Alcohol: Frequency: Just on weekends         Substance Abuse History:     NEGATIVE             Current Problems:     Mild hypertension     Tachycardia, unspecified     Hypertension     Anxiety     Dizziness         Immunizations:     None        Allergies:     Last Reviewed on 9/21/2018 04:05 PM by Diamante Kmep      No Known Drug Allergies.         Current Medications:     Last Reviewed on 9/21/2018 04:05 PM by Diamante Kemp      No Known Medications.         OBJECTIVE:        Vitals:         Historical:     03/13/2015  BP:   140/81 mm Hg ( (left arm, , sitting, );)     03/13/2015  Wt:   191.6lbs        Current: 9/21/2018 4:07:19 PM    Ht:  6 ft, 1 in;  Wt: 229.1 lbs;  BMI: 30.2    T: 98.2 F (oral);  BP: 131/74 mm Hg (right arm, sitting);  P: 70 bpm (right arm (BP Cuff), sitting);  sCr: 0.95 mg/dL;  GFR: 120.40        Exams:     PHYSICAL EXAM:     GENERAL: Vitals recorded well developed, well nourished;  well groomed;  no apparent distress;     E/N/T: EARS: external auditory canal occluded by cerumen on the left;  right TM is normal;  OROPHARYNX: posterior pharynx shows erythema;     RESPIRATORY: normal respiratory rate and pattern with no distress; normal breath sounds with no rales, rhonchi, wheezes or rubs;     CARDIOVASCULAR: normal rate; rhythm is regular;  no edema;     LYMPHATIC: no enlargement of cervical or facial nodes;     MUSCULOSKELETAL: normal gait; normal overall tone     NEUROLOGIC: mental status: alert and oriented x 3;     PSYCHIATRIC:  appropriate affect and demeanor; normal speech pattern; grossly normal memory;         Procedures:     Cerumen impaction     Cerumen impaction is noted in the left ear The degree of wax accumulation is moderate in the left ear.  With moderate dificulty, using a syringe irrigation, the wax is removed.  Removed from ear was hard ball of wax and several flaky  particles..  The patient tolerated the procedure well.      There were no complications.  Performed by:pr             ASSESSMENT           V79.0   Z13.89  Screening for depression              DDx:     388.71   H92.01  Otalgia, otogenic origin              DDx:     380.4   H61.22  Cerumen impaction              DDx:         ORDERS:         Other Orders:       [id number]  Removal of impacted cerumen left ear (NURSE)  (In-House)           Depression screen negative  (In-House)                   PLAN: [facility] records          Screening for depression     MIPS PHQ-9 Depression Screening: Completed form scanned and in chart; Total Score 1           Orders:         Depression screen negative  (In-House)            Otalgia, otogenic origin         has been unable to work this week due to symptoms and going back and forth to ER x 2 and contacting ENT that he ultimately could not see due to  being unable to afford $250 up front.  he needs [facility] paperwork completed for time off this week. ok to return to work.           Cerumen impaction TM intact after being irrigated. No redness or infection.           Orders:       [id number]  Removal of impacted cerumen left ear (NURSE)  (In-House)               Patient Recommendations:May return to work on Monday. Follow up with ENT if problem with ears persist. May comeback to get [facility] paperwork             CHARGE CAPTURE           **Please note: ICD descriptions below are intended for billing purposes only and may not represent clinical diagnoses**        Primary Diagnosis:         V79.0 Screening for depression            Z13.89    Encounter for screening for other disorder              Orders:          [id number]   Office visit - new pt, level 2  (In-House)                Depression screen negative  (In-House)           388.71 Otalgia, otogenic origin            H92.01    Otalgia, right ear    380.4 Cerumen impaction            H61.22    Impacted cerumen, left ear               Orders:          [id number]   Removal of impacted cerumen left ear (NURSE)  (In-House)

## 2021-07-01 VITALS
HEART RATE: 57 BPM | BODY MASS INDEX: 30.88 KG/M2 | SYSTOLIC BLOOD PRESSURE: 138 MMHG | WEIGHT: 233 LBS | TEMPERATURE: 97.8 F | DIASTOLIC BLOOD PRESSURE: 87 MMHG | HEIGHT: 73 IN

## 2021-07-01 VITALS
HEIGHT: 73 IN | HEART RATE: 70 BPM | SYSTOLIC BLOOD PRESSURE: 131 MMHG | TEMPERATURE: 98.2 F | DIASTOLIC BLOOD PRESSURE: 74 MMHG | BODY MASS INDEX: 30.36 KG/M2 | WEIGHT: 229.1 LBS

## 2021-07-01 VITALS
BODY MASS INDEX: 31.01 KG/M2 | DIASTOLIC BLOOD PRESSURE: 86 MMHG | WEIGHT: 234 LBS | SYSTOLIC BLOOD PRESSURE: 135 MMHG | HEIGHT: 73 IN | TEMPERATURE: 98.3 F | HEART RATE: 76 BPM

## 2021-07-01 VITALS
BODY MASS INDEX: 31.62 KG/M2 | HEART RATE: 75 BPM | TEMPERATURE: 97.5 F | DIASTOLIC BLOOD PRESSURE: 80 MMHG | HEIGHT: 73 IN | SYSTOLIC BLOOD PRESSURE: 108 MMHG | WEIGHT: 238.6 LBS

## 2021-08-27 ENCOUNTER — OFFICE VISIT (OUTPATIENT)
Dept: FAMILY MEDICINE CLINIC | Age: 36
End: 2021-08-27

## 2021-08-27 VITALS
SYSTOLIC BLOOD PRESSURE: 143 MMHG | BODY MASS INDEX: 31.11 KG/M2 | DIASTOLIC BLOOD PRESSURE: 75 MMHG | WEIGHT: 235.8 LBS | HEART RATE: 75 BPM

## 2021-08-27 DIAGNOSIS — R51.9 NONINTRACTABLE HEADACHE, UNSPECIFIED CHRONICITY PATTERN, UNSPECIFIED HEADACHE TYPE: Primary | ICD-10-CM

## 2021-08-27 DIAGNOSIS — J06.9 ACUTE UPPER RESPIRATORY INFECTION, UNSPECIFIED: ICD-10-CM

## 2021-08-27 LAB
EXPIRATION DATE: NORMAL
FLUAV AG UPPER RESP QL IA.RAPID: NOT DETECTED
FLUBV AG UPPER RESP QL IA.RAPID: NOT DETECTED
INTERNAL CONTROL: NORMAL
Lab: NORMAL
SARS-COV-2 AG UPPER RESP QL IA.RAPID: NOT DETECTED

## 2021-08-27 PROCEDURE — U0003 INFECTIOUS AGENT DETECTION BY NUCLEIC ACID (DNA OR RNA); SEVERE ACUTE RESPIRATORY SYNDROME CORONAVIRUS 2 (SARS-COV-2) (CORONAVIRUS DISEASE [COVID-19]), AMPLIFIED PROBE TECHNIQUE, MAKING USE OF HIGH THROUGHPUT TECHNOLOGIES AS DESCRIBED BY CMS-2020-01-R: HCPCS | Performed by: FAMILY MEDICINE

## 2021-08-27 PROCEDURE — 99213 OFFICE O/P EST LOW 20 MIN: CPT | Performed by: FAMILY MEDICINE

## 2021-08-27 PROCEDURE — 87428 SARSCOV & INF VIR A&B AG IA: CPT | Performed by: FAMILY MEDICINE

## 2021-08-27 NOTE — ASSESSMENT & PLAN NOTE
WILL SENT A PXR COVID TEST.  OTHERWISE ADVISED FLUIDS, REST, OTC MEDS PRN.  CONSIDER ANTIBIOTICS IF SYMPTOMS PERSIST OR WORSEN.

## 2021-08-27 NOTE — PROGRESS NOTES
Chief Complaint  Nasal Congestion and Headache    Lawanda Gamboa presents to [facility]  RETURNED FROM [place] LAST NIGHT.  NO KNOWN COVID EXPOSURE.  TODAY HAS DEVELOPED A WATERY RUNNY NOSE AND A MILD FRONTAL HEADACHE.  NO CHANGE IN HIS SENSE OF TASTE OR SMELL.          No Known Allergies     Health Maintenance Due   Topic Date Due   • ANNUAL PHYSICAL  Never done   • Pneumococcal Vaccine 0-64 (1 of 2 - PPSV23) Never done   • COVID-19 Vaccine (1) Never done   • TDAP/TD VACCINES (1 - Tdap) Never done   • HEPATITIS C SCREENING  Never done        Current Outpatient Medications on File Prior to Visit   Medication Sig   • cyclobenzaprine (FLEXERIL) 10 MG tablet Take 1 tablet by mouth At Night As Needed for Muscle Spasms.   • cyclobenzaprine (FLEXERIL) 5 MG tablet Take 5 mg by mouth 3 (Three) Times a Day As Needed for Muscle Spasms.   • ibuprofen (ADVIL,MOTRIN) 200 MG tablet Take 200 mg by mouth Every 6 (Six) Hours As Needed for Mild Pain .   • ibuprofen (ADVIL,MOTRIN) 800 MG tablet Take 1 tablet by mouth 3 (Three) Times a Day.     No current facility-administered medications on file prior to visit.         There is no immunization history on file for this patient.    Review of Systems   Constitutional: Negative for activity change, appetite change, chills, fatigue and fever.   HENT: Negative for ear pain, rhinorrhea and sore throat.    Eyes: Negative for discharge.   Respiratory: Negative for cough and shortness of breath.    Gastrointestinal: Negative for abdominal pain, diarrhea, nausea and vomiting.   Musculoskeletal: Negative for arthralgias and myalgias.        Objective     /75 (BP Location: Left arm, Patient Position: Sitting)   Pulse 75   Wt 107 kg (235 lb 12.8 oz)   BMI 31.11 kg/m²       Physical Exam  Vitals and nursing note reviewed.   Constitutional:       General: He is not in acute distress.     Appearance: Normal appearance.   HENT:      Right Ear:  Tympanic membrane normal.      Left Ear: Tympanic membrane normal.      Mouth/Throat:      Pharynx: Oropharynx is clear.   Eyes:      Conjunctiva/sclera: Conjunctivae normal.   Cardiovascular:      Rate and Rhythm: Normal rate and regular rhythm.      Heart sounds: No murmur heard.     Pulmonary:      Effort: Pulmonary effort is normal.      Breath sounds: Normal breath sounds.   Abdominal:      Palpations: Abdomen is soft.      Tenderness: There is no abdominal tenderness.   Musculoskeletal:         General: No swelling.      Cervical back: Neck supple.   Lymphadenopathy:      Cervical: No cervical adenopathy.   Neurological:      General: No focal deficit present.      Mental Status: He is alert.      Cranial Nerves: No cranial nerve deficit.      Coordination: Coordination normal.      Gait: Gait normal.   Psychiatric:         Mood and Affect: Mood normal.         Behavior: Behavior normal.         Result Review :                             Assessment and Plan      Diagnoses and all orders for this visit:    1. Nonintractable headache, unspecified chronicity pattern, unspecified headache type (Primary)  -     POCT SARS-CoV-2 Antigen NICKI + Flu  -     COVID-19,CEPHEID/CHACHA/BDMAX,COR/GABRIELA/PAD/DOMINIC IN-HOUSE(OR EMERGENT/ADD-ON),NP SWAB IN TRANSPORT MEDIA 3-4 HR TAT, RT-PCR - Swab, Nasopharynx; Future  -     COVID-19,CEPHEID/CHACHA/BDMAX,COR/GABRIELA/PAD/DOMINIC IN-HOUSE(OR EMERGENT/ADD-ON),NP SWAB IN TRANSPORT MEDIA 3-4 HR TAT, RT-PCR - Swab, Nasopharynx    2. Acute upper respiratory infection, unspecified  Assessment & Plan:  WILL SENT A PXR COVID TEST.  OTHERWISE ADVISED FLUIDS, REST, OTC MEDS PRN.  CONSIDER ANTIBIOTICS IF SYMPTOMS PERSIST OR WORSEN.              Follow Up     Return if symptoms worsen or fail to improve.    Patient was given instructions and counseling regarding his condition or for health maintenance advice. Please see specific information pulled into the AVS if appropriate.

## 2021-08-29 LAB — SARS-COV-2 RNA RESP QL NAA+PROBE: NOT DETECTED

## 2021-09-02 ENCOUNTER — OFFICE VISIT (OUTPATIENT)
Dept: FAMILY MEDICINE CLINIC | Age: 36
End: 2021-09-02

## 2021-09-02 VITALS
HEART RATE: 73 BPM | HEIGHT: 73 IN | SYSTOLIC BLOOD PRESSURE: 141 MMHG | DIASTOLIC BLOOD PRESSURE: 90 MMHG | TEMPERATURE: 97.7 F | BODY MASS INDEX: 31.23 KG/M2 | WEIGHT: 235.6 LBS

## 2021-09-02 DIAGNOSIS — J01.10 ACUTE NON-RECURRENT FRONTAL SINUSITIS: Primary | ICD-10-CM

## 2021-09-02 PROCEDURE — 87428 SARSCOV & INF VIR A&B AG IA: CPT | Performed by: FAMILY MEDICINE

## 2021-09-02 PROCEDURE — 96372 THER/PROPH/DIAG INJ SC/IM: CPT | Performed by: FAMILY MEDICINE

## 2021-09-02 PROCEDURE — 99213 OFFICE O/P EST LOW 20 MIN: CPT | Performed by: FAMILY MEDICINE

## 2021-09-02 RX ORDER — CEFDINIR 300 MG/1
300 CAPSULE ORAL 2 TIMES DAILY
Qty: 20 CAPSULE | Refills: 0 | Status: SHIPPED | OUTPATIENT
Start: 2021-09-02 | End: 2022-06-28

## 2021-09-02 RX ORDER — TRIAMCINOLONE ACETONIDE 40 MG/ML
60 INJECTION, SUSPENSION INTRA-ARTICULAR; INTRAMUSCULAR ONCE
Status: COMPLETED | OUTPATIENT
Start: 2021-09-02 | End: 2021-09-02

## 2021-09-02 RX ORDER — FAMOTIDINE 20 MG/1
20 TABLET, FILM COATED ORAL 2 TIMES DAILY
Qty: 60 TABLET | Refills: 0 | Status: SHIPPED | OUTPATIENT
Start: 2021-09-02 | End: 2022-06-28

## 2021-09-02 RX ADMIN — TRIAMCINOLONE ACETONIDE 60 MG: 40 INJECTION, SUSPENSION INTRA-ARTICULAR; INTRAMUSCULAR at 16:26

## 2021-09-02 NOTE — PROGRESS NOTES
Sarthak Gamboa presents to [facility].    Chief Complaint:  Sinus issues    Subjective       History of Present Illness:  Sarthak is in today for follow-up on sinus symptoms.  Please see his visit from last week.  He was tested both with a rapid and PCR testing for COVID-19.  They were negative.  He was not given any kind of medication or antibiotics for this.  At any rate, he has continued to not feel well.  He has sinus pressure and headache and has some yellow sinus drainage particularly in the mornings.  He also has noted some epigastric abdominal pain along with some nausea.  He is also had a pretty significant headache.  Mostly a frontal headache.      Review of Systems:  Review of Systems   Constitutional: Positive for chills and fatigue. Negative for fever.   HENT: Positive for postnasal drip, sinus pressure and sore throat.    Respiratory: Positive for cough and shortness of breath (sometimes - yah).    Cardiovascular: Negative for chest pain and palpitations.   Gastrointestinal: Positive for abdominal pain and nausea.   Genitourinary: Negative for dysuria and hematuria.        Objective   Medical History:  No past medical history on file.  Past Surgical History:   • ABSCESS DRAINAGE    Incision and drainage of simple abscess on left thigh-Dr. Kamlesh Maldonado      History reviewed. No pertinent family history.  Social History     Tobacco Use   • Smoking status: Current Every Day Smoker     Packs/day: 0.50     Years: 1.00     Pack years: 0.50   • Smokeless tobacco: Never Used   Substance Use Topics   • Alcohol use: No       Health Maintenance Due   Topic Date Due   • ANNUAL PHYSICAL  Never done   • Pneumococcal Vaccine 0-64 (1 of 2 - PPSV23) Never done   • COVID-19 Vaccine (1) Never done   • TDAP/TD VACCINES (1 - Tdap) Never done   • HEPATITIS C SCREENING  Never done          There is no immunization history on file for this patient.    No Known Allergies     Medications:  No current  "outpatient medications on file prior to visit.     No current facility-administered medications on file prior to visit.       Vital Signs:   /90 (BP Location: Left arm, Patient Position: Sitting)   Pulse 73   Temp 97.7 °F (36.5 °C) (Oral)   Ht 185.4 cm (73\")   Wt 107 kg (235 lb 9.6 oz)   BMI 31.08 kg/m²       Physical Exam:  Physical Exam  Vitals and nursing note reviewed.   Constitutional:       General: He is not in acute distress.     Appearance: He is not ill-appearing.   HENT:      Right Ear: Tympanic membrane and ear canal normal.      Left Ear: Tympanic membrane and ear canal normal.      Mouth/Throat:      Mouth: Mucous membranes are moist.      Comments: Mild redness of the throat is noted  Eyes:      Extraocular Movements: Extraocular movements intact.      Pupils: Pupils are equal, round, and reactive to light.   Neck:      Thyroid: No thyromegaly.   Cardiovascular:      Rate and Rhythm: Normal rate and regular rhythm.      Heart sounds: No murmur heard.     Pulmonary:      Effort: Pulmonary effort is normal.      Breath sounds: Normal breath sounds.   Abdominal:      General: There is no distension.      Palpations: Abdomen is soft. There is no mass.      Tenderness: There is no abdominal tenderness.   Musculoskeletal:      Cervical back: Normal range of motion.   Skin:     Findings: No lesion or rash.   Neurological:      General: No focal deficit present.      Mental Status: He is oriented to person, place, and time.      Cranial Nerves: No cranial nerve deficit.   Psychiatric:         Mood and Affect: Mood normal.         Result Review      The following data was reviewed by Bruno Lyons MD on 09/02/2021.  No results found for: WBC, HGB, HCT, MCV, PLT  Lab Results   Component Value Date    BUN 14 03/19/2019    CREATININE 0.94 03/19/2019    BCR 15 03/19/2019    K 4.2 03/19/2019    CO2 24 03/19/2019    CALCIUM 9.4 03/19/2019    ALBUMIN 4.4 03/19/2019    LABIL2 1.2 (L) 03/19/2019    " AST 48 03/19/2019    ALT 95 (H) 03/19/2019     No results found for: CHOL, CHLPL, TRIG, HDL, LDL, LDLDIRECT  No results found for: TSH  No results found for: HGBA1C           Assessment and Plan:   Today, we will go ahead and cover Sarthak for possible sinus infection as noted below.  I am also going to give him some treatment for his stomach as a precaution.  If he does not start to feel better pretty quickly, we may consider other lab evaluation or possibly imaging.  We will see what his Covid test shows and move forward from there.       Diagnoses and all orders for this visit:    1. Acute non-recurrent frontal sinusitis (Primary)  -     POCT SARS-CoV-2 Antigen NICKI + Flu  -     cefdinir (OMNICEF) 300 MG capsule; Take 1 capsule by mouth 2 (Two) Times a Day.  Dispense: 20 capsule; Refill: 0  -     triamcinolone acetonide (KENALOG-40) injection 60 mg    Other orders  -     famotidine (PEPCID) 20 MG tablet; Take 1 tablet by mouth 2 (Two) Times a Day.  Dispense: 60 tablet; Refill: 0          Follow Up   Return if symptoms worsen or fail to improve.  Patient was given instructions and counseling regarding his condition or for health maintenance advice. Please see specific information pulled into the AVS if appropriate.

## 2021-09-02 NOTE — PATIENT INSTRUCTIONS
Sinusitis, Adult  Sinusitis is inflammation of your sinuses. Sinuses are hollow spaces in the bones around your face. Your sinuses are located:  · Around your eyes.  · In the middle of your forehead.  · Behind your nose.  · In your cheekbones.  Mucus normally drains out of your sinuses. When your nasal tissues become inflamed or swollen, mucus can become trapped or blocked. This allows bacteria, viruses, and fungi to grow, which leads to infection. Most infections of the sinuses are caused by a virus.  Sinusitis can develop quickly. It can last for up to 4 weeks (acute) or for more than 12 weeks (chronic). Sinusitis often develops after a cold.  What are the causes?  This condition is caused by anything that creates swelling in the sinuses or stops mucus from draining. This includes:  · Allergies.  · Asthma.  · Infection from bacteria or viruses.  · Deformities or blockages in your nose or sinuses.  · Abnormal growths in the nose (nasal polyps).  · Pollutants, such as chemicals or irritants in the air.  · Infection from fungi (rare).  What increases the risk?  You are more likely to develop this condition if you:  · Have a weak body defense system (immune system).  · Do a lot of swimming or diving.  · Overuse nasal sprays.  · Smoke.  What are the signs or symptoms?  The main symptoms of this condition are pain and a feeling of pressure around the affected sinuses. Other symptoms include:  · Stuffy nose or congestion.  · Thick drainage from your nose.  · Swelling and warmth over the affected sinuses.  · Headache.  · Upper toothache.  · A cough that may get worse at night.  · Extra mucus that collects in the throat or the back of the nose (postnasal drip).  · Decreased sense of smell and taste.  · Fatigue.  · A fever.  · Sore throat.  · Bad breath.  How is this diagnosed?  This condition is diagnosed based on:  · Your symptoms.  · Your medical history.  · A physical exam.  · Tests to find out if your condition is  acute or chronic. This may include:  ? Checking your nose for nasal polyps.  ? Viewing your sinuses using a device that has a light (endoscope).  ? Testing for allergies or bacteria.  ? Imaging tests, such as an MRI or CT scan.  In rare cases, a bone biopsy may be done to rule out more serious types of fungal sinus disease.  How is this treated?  Treatment for sinusitis depends on the cause and whether your condition is chronic or acute.  · If caused by a virus, your symptoms should go away on their own within 10 days. You may be given medicines to relieve symptoms. They include:  ? Medicines that shrink swollen nasal passages (topical intranasal decongestants).  ? Medicines that treat allergies (antihistamines).  ? A spray that eases inflammation of the nostrils (topical intranasal corticosteroids).  ? Rinses that help get rid of thick mucus in your nose (nasal saline washes).  · If caused by bacteria, your health care provider may recommend waiting to see if your symptoms improve. Most bacterial infections will get better without antibiotic medicine. You may be given antibiotics if you have:  ? A severe infection.  ? A weak immune system.  · If caused by narrow nasal passages or nasal polyps, you may need to have surgery.  Follow these instructions at home:  Medicines  · Take, use, or apply over-the-counter and prescription medicines only as told by your health care provider. These may include nasal sprays.  · If you were prescribed an antibiotic medicine, take it as told by your health care provider. Do not stop taking the antibiotic even if you start to feel better.  Hydrate and humidify    · Drink enough fluid to keep your urine pale yellow. Staying hydrated will help to thin your mucus.  · Use a cool mist humidifier to keep the humidity level in your home above 50%.  · Inhale steam for 10-15 minutes, 3-4 times a day, or as told by your health care provider. You can do this in the bathroom while a hot shower is  running.  · Limit your exposure to cool or dry air.  Rest  · Rest as much as possible.  · Sleep with your head raised (elevated).  · Make sure you get enough sleep each night.  General instructions    · Apply a warm, moist washcloth to your face 3-4 times a day or as told by your health care provider. This will help with discomfort.  · Wash your hands often with soap and water to reduce your exposure to germs. If soap and water are not available, use hand .  · Do not smoke. Avoid being around people who are smoking (secondhand smoke).  · Keep all follow-up visits as told by your health care provider. This is important.  Contact a health care provider if:  · You have a fever.  · Your symptoms get worse.  · Your symptoms do not improve within 10 days.  Get help right away if:  · You have a severe headache.  · You have persistent vomiting.  · You have severe pain or swelling around your face or eyes.  · You have vision problems.  · You develop confusion.  · Your neck is stiff.  · You have trouble breathing.  Summary  · Sinusitis is soreness and inflammation of your sinuses. Sinuses are hollow spaces in the bones around your face.  · This condition is caused by nasal tissues that become inflamed or swollen. The swelling traps or blocks the flow of mucus. This allows bacteria, viruses, and fungi to grow, which leads to infection.  · If you were prescribed an antibiotic medicine, take it as told by your health care provider. Do not stop taking the antibiotic even if you start to feel better.  · Keep all follow-up visits as told by your health care provider. This is important.  This information is not intended to replace advice given to you by your health care provider. Make sure you discuss any questions you have with your health care provider.  Document Revised: 05/20/2019 Document Reviewed: 05/20/2019  [employer] Patient Education © 2021 [employer]

## 2022-06-28 ENCOUNTER — OFFICE VISIT (OUTPATIENT)
Dept: FAMILY MEDICINE CLINIC | Age: 37
End: 2022-06-28

## 2022-06-28 ENCOUNTER — LAB (OUTPATIENT)
Dept: LAB | Facility: HOSPITAL | Age: 37
End: 2022-06-28

## 2022-06-28 VITALS
DIASTOLIC BLOOD PRESSURE: 93 MMHG | WEIGHT: 249 LBS | HEART RATE: 51 BPM | OXYGEN SATURATION: 96 % | SYSTOLIC BLOOD PRESSURE: 137 MMHG | BODY MASS INDEX: 33 KG/M2 | TEMPERATURE: 97.9 F | HEIGHT: 73 IN

## 2022-06-28 DIAGNOSIS — R10.11 RUQ PAIN: ICD-10-CM

## 2022-06-28 DIAGNOSIS — K21.9 GASTROESOPHAGEAL REFLUX DISEASE WITHOUT ESOPHAGITIS: ICD-10-CM

## 2022-06-28 DIAGNOSIS — R03.0 ELEVATED BLOOD PRESSURE READING IN OFFICE WITHOUT DIAGNOSIS OF HYPERTENSION: ICD-10-CM

## 2022-06-28 DIAGNOSIS — I87.1 HEPATIC VEIN STENOSIS: ICD-10-CM

## 2022-06-28 DIAGNOSIS — R73.09 ELEVATED GLUCOSE: ICD-10-CM

## 2022-06-28 DIAGNOSIS — R74.8 ELEVATED LIVER ENZYMES: ICD-10-CM

## 2022-06-28 DIAGNOSIS — R10.11 RUQ PAIN: Primary | ICD-10-CM

## 2022-06-28 LAB — UREA BREATH TEST QL: NEGATIVE

## 2022-06-28 PROCEDURE — 99214 OFFICE O/P EST MOD 30 MIN: CPT | Performed by: NURSE PRACTITIONER

## 2022-06-28 PROCEDURE — 83013 H PYLORI (C-13) BREATH: CPT

## 2022-06-28 RX ORDER — PANTOPRAZOLE SODIUM 40 MG/1
40 TABLET, DELAYED RELEASE ORAL DAILY
Qty: 90 TABLET | Refills: 0 | Status: SHIPPED | OUTPATIENT
Start: 2022-06-28 | End: 2022-09-23

## 2022-07-01 ENCOUNTER — APPOINTMENT (OUTPATIENT)
Dept: ULTRASOUND IMAGING | Facility: HOSPITAL | Age: 37
End: 2022-07-01

## 2022-07-01 PROBLEM — M72.2 PLANTAR FASCIAL FIBROMATOSIS: Status: ACTIVE | Noted: 2019-03-22

## 2022-07-05 ENCOUNTER — HOSPITAL ENCOUNTER (OUTPATIENT)
Dept: ULTRASOUND IMAGING | Facility: HOSPITAL | Age: 37
Discharge: HOME | End: 2022-07-05

## 2022-07-05 ENCOUNTER — LAB (OUTPATIENT)
Dept: LAB | Facility: HOSPITAL | Age: 37
End: 2022-07-05

## 2022-07-05 DIAGNOSIS — I87.1 HEPATIC VEIN STENOSIS: ICD-10-CM

## 2022-07-05 DIAGNOSIS — R73.09 ELEVATED GLUCOSE: ICD-10-CM

## 2022-07-05 DIAGNOSIS — R74.8 ELEVATED LIVER ENZYMES: ICD-10-CM

## 2022-07-05 DIAGNOSIS — R10.11 RUQ PAIN: ICD-10-CM

## 2022-07-05 LAB
CHOLEST SERPL-MCNC: 155 MG/DL (ref 0–200)
HBA1C MFR BLD: 5.5 % (ref 4.8–5.6)
HDLC SERPL-MCNC: 47 MG/DL (ref 40–60)
LDLC SERPL CALC-MCNC: 89 MG/DL (ref 0–100)
LDLC/HDLC SERPL: 1.87 {RATIO}
TRIGL SERPL-MCNC: 101 MG/DL (ref 0–150)
VLDLC SERPL-MCNC: 19 MG/DL (ref 5–40)

## 2022-07-05 PROCEDURE — 83036 HEMOGLOBIN GLYCOSYLATED A1C: CPT

## 2022-07-05 PROCEDURE — 80061 LIPID PANEL: CPT

## 2022-07-05 PROCEDURE — 36415 COLL VENOUS BLD VENIPUNCTURE: CPT

## 2022-07-05 PROCEDURE — 76705 ECHO EXAM OF ABDOMEN: CPT

## 2022-07-20 ENCOUNTER — APPOINTMENT (OUTPATIENT)
Dept: ULTRASOUND IMAGING | Facility: HOSPITAL | Age: 37
End: 2022-07-20

## 2022-09-10 ENCOUNTER — HOSPITAL ENCOUNTER (OUTPATIENT)
Dept: OTHER | Facility: HOSPITAL | Age: 37
Discharge: HOME | End: 2022-09-10

## 2022-09-13 ENCOUNTER — OFFICE VISIT (OUTPATIENT)
Dept: FAMILY MEDICINE CLINIC | Age: 37
End: 2022-09-13

## 2022-09-13 VITALS
BODY MASS INDEX: 32.34 KG/M2 | WEIGHT: 244 LBS | HEART RATE: 82 BPM | HEIGHT: 73 IN | TEMPERATURE: 97.8 F | OXYGEN SATURATION: 97 % | DIASTOLIC BLOOD PRESSURE: 89 MMHG | SYSTOLIC BLOOD PRESSURE: 145 MMHG

## 2022-09-13 DIAGNOSIS — R42 DIZZINESS: ICD-10-CM

## 2022-09-13 DIAGNOSIS — I10 PRIMARY HYPERTENSION: Primary | ICD-10-CM

## 2022-09-13 DIAGNOSIS — F41.9 ANXIETY: ICD-10-CM

## 2022-09-13 DIAGNOSIS — S02.40CS: ICD-10-CM

## 2022-09-13 DIAGNOSIS — H66.93 OTITIS OF BOTH EARS: ICD-10-CM

## 2022-09-13 PROCEDURE — 99214 OFFICE O/P EST MOD 30 MIN: CPT | Performed by: NURSE PRACTITIONER

## 2022-09-13 RX ORDER — CLONIDINE HYDROCHLORIDE 0.1 MG/1
0.1 TABLET ORAL 2 TIMES DAILY PRN
Qty: 90 TABLET | Refills: 1 | Status: SHIPPED | OUTPATIENT
Start: 2022-09-13

## 2022-09-13 RX ORDER — AMOXICILLIN AND CLAVULANATE POTASSIUM 500; 125 MG/1; MG/1
TABLET, FILM COATED ORAL
COMMUNITY
Start: 2022-09-10

## 2022-09-13 RX ORDER — MECLIZINE HYDROCHLORIDE 25 MG/1
TABLET ORAL
COMMUNITY
Start: 2022-09-10

## 2022-09-13 RX ORDER — BUSPIRONE HYDROCHLORIDE 5 MG/1
5 TABLET ORAL 3 TIMES DAILY PRN
Qty: 60 TABLET | Refills: 1 | Status: SHIPPED | OUTPATIENT
Start: 2022-09-13

## 2022-09-13 NOTE — PROGRESS NOTES
Chief Complaint  Hospital Follow Up Visit (Went to [facility] 9/9/22 by ambulance, pt states he got dizzy and fell out of bed and then went unresponsive. Flagte stated to follow up with ENT and PCP)    Subjective    Patient is a 36-year-old male in today for a hospital follow-up visit.  Patient went to [facility] on September 9 via ambulance after an episode of dizziness.  Patient reports feeling fine, but when he had went to bed that night he felt really dizzy like the room was spinning around him, and he began vomiting, and fell out of the bed and became unresponsive.  Patient reports he could hear what was going on around him, but could not respond.  Patient reports he had many test at the ER, and was diagnosed with a fractured mandible, and sinus infection, and is now taking Augmentin.  Patient reports no relief of symptoms with Augmentin, although he reports the vomiting has stopped.  Patient went to urgent care today, where he was treated with Dramamine, but he has not picked up that medication yet.  Patient denies any symptoms prior to incident, does report that he is under quite a bit of stress at this time.  Patient also reports having COVID the beginning of August.         Sarthak Gamboa presents to [facility]  Dizziness  This is a new problem. The current episode started in the past 7 days. The problem occurs constantly. The problem has been gradually improving. Associated symptoms include chest pain, headaches and vertigo. Pertinent negatives include no abdominal pain, congestion or fever. The symptoms are aggravated by bending and stress. He has tried position changes and lying down for the symptoms. The treatment provided no relief.   Hypertension  This is a new problem. The problem has been gradually worsening since onset. Associated symptoms include anxiety, chest pain, headaches, malaise/fatigue and shortness of breath. Pertinent negatives include no blurred vision. Risk  "factors for coronary artery disease include family history, male gender and stress. Past treatments include nothing. Current antihypertension treatment includes lifestyle changes. The current treatment provides no improvement. Compliance problems include psychosocial issues.    Anxiety  Presents for initial visit. Onset was 1 to 4 weeks ago. The problem has been rapidly worsening. Symptoms include chest pain, dizziness, irritability, nervous/anxious behavior and shortness of breath. Patient reports no suicidal ideas. Symptoms occur most days. The severity of symptoms is severe. The symptoms are aggravated by family issues. The quality of sleep is fair. Nighttime awakenings: occasional.     Risk factors include a major life event and marital problems. Past treatments include nothing.       Objective   Vital Signs:  /89 (BP Location: Left arm, Patient Position: Sitting)   Pulse 82   Temp 97.8 °F (36.6 °C)   Ht 185.4 cm (72.99\")   Wt 111 kg (244 lb)   SpO2 97%   BMI 32.20 kg/m²   Estimated body mass index is 32.2 kg/m² as calculated from the following:    Height as of this encounter: 185.4 cm (72.99\").    Weight as of this encounter: 111 kg (244 lb).          Physical Exam  HENT:      Head: Normocephalic.      Right Ear: Decreased hearing noted. Tenderness present. Tympanic membrane is injected, erythematous and bulging.      Left Ear: Decreased hearing noted. Tympanic membrane is erythematous.      Nose:      Right Sinus: Maxillary sinus tenderness present.   Cardiovascular:      Rate and Rhythm: Normal rate and regular rhythm.   Pulmonary:      Effort: Pulmonary effort is normal. No respiratory distress.      Breath sounds: Normal breath sounds. No stridor. No wheezing, rhonchi or rales.   Skin:     General: Skin is warm and dry.   Neurological:      Mental Status: He is alert and oriented to person, place, and time.   Psychiatric:         Mood and Affect: Mood normal.        Result Review :              "   Assessment and Plan   Diagnoses and all orders for this visit:    1. Primary hypertension (Primary)  Assessment & Plan:  Hypertension is worsening.  Dietary sodium restriction.  Weight loss.  Regular aerobic exercise.  Medication changes per orders.  Ambulatory blood pressure monitoring.  Blood pressure will be reassessed at the next regular appointment.    Orders:  -     cloNIDine (Catapres) 0.1 MG tablet; Take 1 tablet by mouth 2 (Two) Times a Day As Needed for High Blood Pressure.  Dispense: 90 tablet; Refill: 1    2. Dizziness  -     Ambulatory Referral to ENT (Otolaryngology)    3. Closed fracture of right side of maxilla, sequela (HCC)  -     Ambulatory Referral to ENT (Otolaryngology)    4. Anxiety  -     busPIRone (BUSPAR) 5 MG tablet; Take 1 tablet by mouth 3 (Three) Times a Day As Needed (Anxiety).  Dispense: 60 tablet; Refill: 1    5. Otitis of both ears  -     Ambulatory Referral to ENT (Otolaryngology)           Follow Up   Return in about 3 months (around 12/13/2022), or if symptoms worsen or fail to improve, for Recheck with PCP.  Patient was given instructions and counseling regarding his condition or for health maintenance advice. Please see specific information pulled into the AVS if appropriate.

## 2022-09-14 PROBLEM — F41.9 ANXIETY: Status: ACTIVE | Noted: 2022-09-14

## 2022-09-14 PROBLEM — I10 PRIMARY HYPERTENSION: Status: ACTIVE | Noted: 2022-09-14

## 2022-09-14 NOTE — ASSESSMENT & PLAN NOTE
Hypertension is worsening.  Dietary sodium restriction.  Weight loss.  Regular aerobic exercise.  Medication changes per orders.  Ambulatory blood pressure monitoring.  Blood pressure will be reassessed at the next regular appointment.

## 2022-09-23 DIAGNOSIS — K21.9 GASTROESOPHAGEAL REFLUX DISEASE WITHOUT ESOPHAGITIS: ICD-10-CM

## 2022-09-23 RX ORDER — PANTOPRAZOLE SODIUM 40 MG/1
40 TABLET, DELAYED RELEASE ORAL DAILY
Qty: 90 TABLET | Refills: 0 | Status: SHIPPED | OUTPATIENT
Start: 2022-09-23

## 2022-12-12 ENCOUNTER — TELEPHONE (OUTPATIENT)
Dept: FAMILY MEDICINE CLINIC | Age: 37
End: 2022-12-12

## 2024-09-19 ENCOUNTER — OFFICE VISIT (OUTPATIENT)
Dept: FAMILY MEDICINE CLINIC | Age: 39
End: 2024-09-19

## 2024-09-19 VITALS
OXYGEN SATURATION: 97 % | SYSTOLIC BLOOD PRESSURE: 135 MMHG | DIASTOLIC BLOOD PRESSURE: 86 MMHG | BODY MASS INDEX: 31.51 KG/M2 | TEMPERATURE: 98.2 F | HEIGHT: 73 IN | HEART RATE: 78 BPM | WEIGHT: 237.8 LBS

## 2024-09-19 DIAGNOSIS — J01.90 ACUTE SINUSITIS, RECURRENCE NOT SPECIFIED, UNSPECIFIED LOCATION: Primary | ICD-10-CM

## 2024-09-19 DIAGNOSIS — R42 VERTIGO: ICD-10-CM

## 2024-09-19 RX ORDER — MECLIZINE HYDROCHLORIDE 25 MG/1
25 TABLET ORAL 3 TIMES DAILY PRN
Qty: 30 TABLET | Refills: 0 | Status: SHIPPED | OUTPATIENT
Start: 2024-09-19

## 2024-10-04 ENCOUNTER — OFFICE VISIT (OUTPATIENT)
Dept: FAMILY MEDICINE CLINIC | Age: 39
End: 2024-10-04

## 2024-10-04 VITALS
DIASTOLIC BLOOD PRESSURE: 93 MMHG | SYSTOLIC BLOOD PRESSURE: 143 MMHG | HEART RATE: 67 BPM | TEMPERATURE: 97.8 F | HEIGHT: 73 IN | OXYGEN SATURATION: 95 % | BODY MASS INDEX: 31.38 KG/M2 | WEIGHT: 236.8 LBS

## 2024-10-04 DIAGNOSIS — Z87.81 HISTORY OF FACIAL FRACTURE: ICD-10-CM

## 2024-10-04 DIAGNOSIS — R11.2 NAUSEA AND VOMITING, UNSPECIFIED VOMITING TYPE: ICD-10-CM

## 2024-10-04 DIAGNOSIS — H53.9 VISUAL CHANGES: ICD-10-CM

## 2024-10-04 DIAGNOSIS — R42 DIZZINESS: Primary | ICD-10-CM

## 2024-10-04 NOTE — PROGRESS NOTES
Subjective     CHIEF COMPLAINT    Chief Complaint   Patient presents with    Dizziness    Nausea    Tinnitus       History of Present Illness  Patient is a 38-year-old male, presenting to the clinic today with multiple complaints.  His main concern is that he is having some severe episodes of dizziness and nausea.  Was seen in office on 9-19-24 by MIL Giles.  Treated for acute sinusitis with no improvement in symptoms.  He states that he feels like someone is swinging him by his feet when he is dizzy.  He is currently experiencing the dizziness.  Has frequent episodes of this.  Denies any loss of consciousness.  Vision becomes blurry during the spells.  He does have nausea and will vomit.  Also notes sinus pressure on the left side.  States that about 2 years ago he was diagnosed with a facial fracture and was following with ENT but never followed up to see if it had healed.  Denies any known cardiac issues.  No known neurologic issues.  Dizziness is worse when turning his head or looking at his phone.  Was also prescribed meclizine which was not helpful.  Denies any specific fever but does feel very warm.  Notes headaches and shortness of breath.      Review of Systems   Constitutional:  Positive for fatigue and fever.   HENT:  Positive for congestion and sinus pressure.    Eyes:  Positive for visual disturbance.   Respiratory:  Positive for shortness of breath. Negative for wheezing.    Cardiovascular:  Negative for chest pain.   Gastrointestinal:  Positive for abdominal pain, nausea and vomiting.   Neurological:  Positive for dizziness and headaches.     History reviewed. No pertinent past medical history.      Past Surgical History:   Procedure Laterality Date    ABSCESS DRAINAGE Left 03/01/2017    Incision and drainage of simple abscess on left thigh-Dr. Kamlesh Maldonado     History reviewed. No pertinent family history.    Social History     Socioeconomic History    Marital status: Single   Tobacco  "Use    Smoking status: Former     Current packs/day: 0.00     Average packs/day: 0.5 packs/day for 1 year (0.5 ttl pk-yrs)     Types: Cigarettes     Start date: 2021     Quit date: 2022     Years since quittin.0    Smokeless tobacco: Never   Vaping Use    Vaping status: Never Used   Substance and Sexual Activity    Alcohol use: No    Drug use: No    Sexual activity: Defer            No Known Allergies         Current Outpatient Medications on File Prior to Visit   Medication Sig Dispense Refill    [DISCONTINUED] meclizine (ANTIVERT) 25 MG tablet Take 1 tablet by mouth 3 (Three) Times a Day As Needed for Dizziness or Nausea. (Patient not taking: Reported on 10/4/2024) 30 tablet 0     No current facility-administered medications on file prior to visit.            /93 (BP Location: Left arm, Patient Position: Sitting, Cuff Size: Large Adult)   Pulse 67   Temp 97.8 °F (36.6 °C) (Oral)   Ht 185.4 cm (72.99\")   Wt 107 kg (236 lb 12.8 oz)   SpO2 95%   BMI 31.25 kg/m²          Objective     Physical Exam  Vitals and nursing note reviewed.   Constitutional:       General: He is not in acute distress.     Appearance: Normal appearance. He is well-developed. He is not ill-appearing.   HENT:      Head: Normocephalic.      Right Ear: Tympanic membrane, ear canal and external ear normal.      Left Ear: Tympanic membrane, ear canal and external ear normal.      Nose: Nose normal.      Mouth/Throat:      Lips: Pink.      Mouth: Mucous membranes are moist.   Eyes:      Extraocular Movements: Extraocular movements intact.      Pupils: Pupils are equal, round, and reactive to light.   Cardiovascular:      Rate and Rhythm: Normal rate and regular rhythm.      Heart sounds: Normal heart sounds. No murmur heard.  Pulmonary:      Effort: Pulmonary effort is normal. No accessory muscle usage or respiratory distress.      Breath sounds: Normal breath sounds. No wheezing or rhonchi.   Abdominal:      General: Bowel " sounds are normal. There is no distension.      Palpations: Abdomen is soft.      Tenderness: There is abdominal tenderness in the right upper quadrant and right lower quadrant.   Musculoskeletal:      Cervical back: Normal range of motion.   Skin:     General: Skin is warm and dry.   Neurological:      General: No focal deficit present.      Mental Status: He is alert and oriented to person, place, and time.      Cranial Nerves: No facial asymmetry.      Sensory: Sensation is intact.      Motor: No weakness.      Coordination: Coordination is intact. Finger-Nose-Finger Test normal. Rapid alternating movements normal.      Gait: Gait is intact.   Psychiatric:         Mood and Affect: Mood and affect normal.         Behavior: Behavior normal.          Assessment & Plan  Dizziness    Nausea and vomiting, unspecified vomiting type    History of facial fracture    Visual changes          Patient has been seen recently and treated for sinusitis with antibiotics with no improvement in symptoms.  The description of symptoms today as well as severity of symptoms is a bit concerning.  His neuroexam appears intact today.  However given his symptoms and description, recommend proceeding to the ER for further workup and evaluation as we would not be able to complete imaging or labs in a timely manner in the outpatient setting.  Patient is agreeable to plan.  Plans to go to [facility] for further evaluation. Patient feels safe to drive himself, declines EMS.        Follow up:  No follow-ups on file.  Patient was given instructions and counseling regarding his condition or for health maintenance advice. Please see specific information pulled into the AVS if appropriate.

## 2024-11-01 ENCOUNTER — OFFICE VISIT (OUTPATIENT)
Dept: FAMILY MEDICINE CLINIC | Age: 39
End: 2024-11-01
Payer: COMMERCIAL

## 2024-11-01 VITALS
WEIGHT: 238 LBS | SYSTOLIC BLOOD PRESSURE: 129 MMHG | HEART RATE: 72 BPM | BODY MASS INDEX: 31.54 KG/M2 | DIASTOLIC BLOOD PRESSURE: 79 MMHG | HEIGHT: 73 IN | OXYGEN SATURATION: 97 %

## 2024-11-01 DIAGNOSIS — Z86.79 HISTORY OF HYPERTENSION: ICD-10-CM

## 2024-11-01 DIAGNOSIS — S02.40CA: ICD-10-CM

## 2024-11-01 DIAGNOSIS — R42 CHRONIC VERTIGO: Primary | ICD-10-CM

## 2024-11-01 DIAGNOSIS — M72.2 PLANTAR FASCIAL FIBROMATOSIS: ICD-10-CM

## 2024-11-01 PROBLEM — I10 PRIMARY HYPERTENSION: Status: RESOLVED | Noted: 2022-09-14 | Resolved: 2024-11-01

## 2024-11-01 PROBLEM — F41.9 ANXIETY: Status: RESOLVED | Noted: 2022-09-14 | Resolved: 2024-11-01

## 2024-11-01 PROBLEM — F17.210 CIGARETTE SMOKER: Status: ACTIVE | Noted: 2024-11-01

## 2024-11-01 PROBLEM — J06.9 ACUTE UPPER RESPIRATORY INFECTION, UNSPECIFIED: Status: RESOLVED | Noted: 2021-08-27 | Resolved: 2024-11-01

## 2024-11-01 NOTE — PROGRESS NOTES
"Chief Complaint  Follow-up (Issues Planter fasciitis. Both feet. )    Subjective          Sarthak Gamboa presents to [facility]  History of Present Illness  --WAS ON BP MEDS IN THE PAST BUT HAS DONE WELL WITHOUT THEM SO FAR  --LONG HISTORY OF BILATERAL PLANTAR FASCIITIS, HAS SEEN PODIATRY, RECEIVED INJECTIONS AND PERFORMS THE MODALITIES OF STRETCHING AND ICE BUT ALL TO NO AVAIL.  WOULD LIKE TO SEE PODIATRY AGAIN.  --HAD AN EPOSODE OF COVID TWO YEARS AGO.  SUBSEQUENTLY WENT TO THE ER WITH SEVERE VERTIGO.  WORKUP REVEALED A RIGHT MAXILLARY SINUS FRACTURE FOR WHICH HE SAW ENT AND WAS TO HAVE FOLLOWED UP BUT DID NOT GO AS HE FELT BETTER.  NOW VERTIGO OFF AND ON FOR THE PAST FEW MONTHS.          No Known Allergies     Health Maintenance Due   Topic Date Due    TDAP/TD VACCINES (1 - Tdap) Never done    HEPATITIS C SCREENING  Never done    ANNUAL PHYSICAL  Never done    BMI FOLLOWUP  06/28/2023    INFLUENZA VACCINE  Never done    COVID-19 Vaccine (1 - 2023-24 season) Never done        No current outpatient medications on file prior to visit.     No current facility-administered medications on file prior to visit.         There is no immunization history on file for this patient.    Review of Systems   Constitutional:  Negative for activity change, appetite change, chills, fatigue and fever.   HENT:  Negative for congestion, ear pain, rhinorrhea and sore throat.    Respiratory:  Negative for cough and shortness of breath.    Cardiovascular:  Negative for chest pain, palpitations and leg swelling.   Gastrointestinal:  Negative for abdominal pain, constipation, diarrhea, nausea and vomiting.   Musculoskeletal:  Negative for arthralgias and myalgias.   Neurological:  Negative for headache.        Objective     /79 (BP Location: Right arm, Patient Position: Sitting, Cuff Size: Large Adult)   Pulse 72   Ht 185.4 cm (72.99\")   Wt 108 kg (238 lb)   SpO2 97%   BMI 31.41 kg/m²       Physical " Exam  Vitals and nursing note reviewed.   Constitutional:       General: He is not in acute distress.     Appearance: Normal appearance.   Cardiovascular:      Rate and Rhythm: Normal rate and regular rhythm.      Heart sounds: Normal heart sounds. No murmur heard.  Pulmonary:      Effort: Pulmonary effort is normal.      Breath sounds: Normal breath sounds.   Abdominal:      Palpations: Abdomen is soft.      Tenderness: There is no abdominal tenderness.   Musculoskeletal:      Cervical back: Neck supple.      Right lower leg: No edema.      Left lower leg: No edema.   Lymphadenopathy:      Cervical: No cervical adenopathy.   Neurological:      General: No focal deficit present.      Mental Status: He is alert.      Cranial Nerves: No cranial nerve deficit.      Coordination: Coordination normal.      Gait: Gait normal.   Psychiatric:         Mood and Affect: Mood normal.         Behavior: Behavior normal.         Result Review :                             Assessment and Plan      Diagnoses and all orders for this visit:    1. Chronic vertigo (Primary)  -     Ambulatory Referral to ENT (Otolaryngology)    2. Closed fracture of right maxillary sinus  -     Ambulatory Referral to ENT (Otolaryngology)  -     CT Sinuses Limited Study; Future    3. Plantar fascial fibromatosis  -     Ambulatory Referral to Podiatry    4. History of hypertension  Comments:  OBSERVE AS IMPROVED AND WILL REASSES AT NEXT VISIT        BMI is >= 30 and <35. (Class 1 Obesity). The following options were offered after discussion;: nutrition counseling/recommendations           Follow Up     No follow-ups on file.    Patient was given instructions and counseling regarding his condition or for health maintenance advice. Please see specific information pulled into the AVS if appropriate.

## 2024-11-11 ENCOUNTER — HOSPITAL ENCOUNTER (OUTPATIENT)
Dept: CT IMAGING | Facility: HOSPITAL | Age: 39
Discharge: HOME | End: 2024-11-11
Admitting: FAMILY MEDICINE
Payer: COMMERCIAL

## 2024-11-11 DIAGNOSIS — S02.40CA: ICD-10-CM

## 2024-11-11 PROCEDURE — 70486 CT MAXILLOFACIAL W/O DYE: CPT

## 2024-12-19 ENCOUNTER — OFFICE VISIT (OUTPATIENT)
Dept: FAMILY MEDICINE CLINIC | Age: 39
End: 2024-12-19
Payer: COMMERCIAL

## 2024-12-19 VITALS
TEMPERATURE: 98.4 F | OXYGEN SATURATION: 96 % | DIASTOLIC BLOOD PRESSURE: 88 MMHG | SYSTOLIC BLOOD PRESSURE: 143 MMHG | WEIGHT: 236.4 LBS | BODY MASS INDEX: 31.33 KG/M2 | HEART RATE: 73 BPM | HEIGHT: 73 IN

## 2024-12-19 DIAGNOSIS — R50.9 FEVER, UNSPECIFIED FEVER CAUSE: ICD-10-CM

## 2024-12-19 DIAGNOSIS — J06.9 VIRAL URI: Primary | ICD-10-CM

## 2024-12-19 DIAGNOSIS — J02.9 SORE THROAT: ICD-10-CM

## 2024-12-19 LAB
EXPIRATION DATE: NORMAL
EXPIRATION DATE: NORMAL
FLUAV AG UPPER RESP QL IA.RAPID: NOT DETECTED
FLUBV AG UPPER RESP QL IA.RAPID: NOT DETECTED
INTERNAL CONTROL: NORMAL
INTERNAL CONTROL: NORMAL
Lab: NORMAL
Lab: NORMAL
S PYO AG THROAT QL: NEGATIVE
SARS-COV-2 AG UPPER RESP QL IA.RAPID: NOT DETECTED

## 2024-12-19 PROCEDURE — 87081 CULTURE SCREEN ONLY: CPT | Performed by: NURSE PRACTITIONER

## 2024-12-19 PROCEDURE — 87880 STREP A ASSAY W/OPTIC: CPT | Performed by: NURSE PRACTITIONER

## 2024-12-19 PROCEDURE — 87428 SARSCOV & INF VIR A&B AG IA: CPT | Performed by: NURSE PRACTITIONER

## 2024-12-19 PROCEDURE — 99213 OFFICE O/P EST LOW 20 MIN: CPT | Performed by: NURSE PRACTITIONER

## 2024-12-19 NOTE — PROGRESS NOTES
Chief Complaint  Sarthak Gamboa presents to [facility] for Sore Throat (Fever, vomiting onset last night. Pt states his child had strep last weekend. )    Subjective     History of Present Illness  Sarthak  here today for concerns of URI symptoms      Known Exposure to positive case?  Yes, son with strep  Date of exposure?   Daily   Date of symptoms start?    1 day    (Symptoms may appear 2-14 days after exposure )    Fever or chills?  Yes, describe: 102  Cough?   yes  Shortness of breath or difficulty breathing?  yes  Fatigue?   yes  Muscle or body aches?  yes   Headache?   yes  New loss of taste or smell? no  chronic   Sore throat?  yes  Congestion or runny nose? yes  Nausea or vomiting?   yes  Diarrhea?   no    Taking any medications at home to help with symptoms?Yes, describe: antibiotics by Dr. schaefer  Any prior vaccine to covid?   no  Any prior vaccine to flu this season? no  Any significant health problems / existing lung / heart problems?  No other than chronic sinusitis that is managed by Dr. Schaefer                  Assessment and Plan     Diagnoses and all orders for this visit:    1. Viral URI (Primary)  Comments:  advised on symptomatic treatemtn  f/u if new or worsening    2. Sore throat  -     POCT rapid strep A  -     Beta Strep Culture, Throat - , Throat; Future  -     Beta Strep Culture, Throat - Swab, Throat    3. Fever, unspecified fever cause  -     POCT SARS-CoV-2 Antigen NICKI + Flu            Follow Up   Return if symptoms worsen or fail to improve.  Future Appointments   Date Time Provider Department Center   12/31/2024  3:00 PM Jones Mckinney DPM [facility]       No orders of the defined types were placed in this encounter.      There are no discontinued medications.       Review of Systems    Objective     Vitals:    12/19/24 1445   BP: 143/88   BP Location: Left arm   Patient Position: Sitting   Cuff Size: Large Adult   Pulse: 73   Temp: 98.4 °F  "(36.9 °C)   TempSrc: Oral   SpO2: 96%   Weight: 107 kg (236 lb 6.4 oz)   Height: 185.4 cm (72.99\")            Physical Exam  Vitals reviewed.   Constitutional:       Appearance: Normal appearance. He is ill-appearing.   HENT:      Head: Normocephalic.      Nose: Congestion present.   Eyes:      Pupils: Pupils are equal, round, and reactive to light.   Cardiovascular:      Rate and Rhythm: Normal rate and regular rhythm.      Heart sounds: No murmur heard.  Pulmonary:      Effort: Pulmonary effort is normal.      Breath sounds: Normal breath sounds.   Musculoskeletal:         General: Normal range of motion.   Neurological:      Mental Status: He is alert.   Psychiatric:         Mood and Affect: Mood normal.         Behavior: Behavior normal.               Result Review        No Known Allergies   No past medical history on file.  No current outpatient medications on file.     No current facility-administered medications for this visit.     Past Surgical History:   Procedure Laterality Date    ABSCESS DRAINAGE Left 03/01/2017    Incision and drainage of simple abscess on left thigh-Dr. Kamlesh Maldonado      Health Maintenance Due   Topic Date Due    Pneumococcal Vaccine 0-64 (1 of 2 - PCV) Never done    TDAP/TD VACCINES (1 - Tdap) Never done    HEPATITIS C SCREENING  Never done    ANNUAL PHYSICAL  Never done    INFLUENZA VACCINE  Never done    COVID-19 Vaccine (1 - 2024-25 season) Never done        There is no immunization history on file for this patient.      Part of this note may be an electronic transcription/translation of spoken language to printed   text using the Dragon Dictation System.      Christel Sauer, MIL  "

## 2024-12-21 LAB — BACTERIA SPEC AEROBE CULT: NORMAL

## 2024-12-31 ENCOUNTER — OFFICE VISIT (OUTPATIENT)
Dept: PODIATRY | Facility: CLINIC | Age: 39
End: 2024-12-31
Payer: COMMERCIAL

## 2024-12-31 VITALS
OXYGEN SATURATION: 97 % | DIASTOLIC BLOOD PRESSURE: 83 MMHG | BODY MASS INDEX: 31.28 KG/M2 | HEART RATE: 81 BPM | WEIGHT: 236 LBS | HEIGHT: 73 IN | TEMPERATURE: 97.8 F | SYSTOLIC BLOOD PRESSURE: 132 MMHG

## 2024-12-31 DIAGNOSIS — M79.671 FOOT PAIN, BILATERAL: Primary | ICD-10-CM

## 2024-12-31 DIAGNOSIS — M72.2 PLANTAR FASCIITIS: ICD-10-CM

## 2024-12-31 DIAGNOSIS — M79.672 FOOT PAIN, BILATERAL: Primary | ICD-10-CM

## 2024-12-31 RX ORDER — TRIAMCINOLONE ACETONIDE 40 MG/ML
20 INJECTION, SUSPENSION INTRA-ARTICULAR; INTRAMUSCULAR ONCE
Status: COMPLETED | OUTPATIENT
Start: 2024-12-31 | End: 2024-12-31

## 2024-12-31 RX ORDER — BUPIVACAINE HYDROCHLORIDE 5 MG/ML
0.5 INJECTION, SOLUTION PERINEURAL ONCE
Status: COMPLETED | OUTPATIENT
Start: 2024-12-31 | End: 2024-12-31

## 2024-12-31 RX ORDER — TESTOSTERONE CYPIONATE 200 MG/ML
4 INJECTION INTRAMUSCULAR ONCE
Status: COMPLETED | OUTPATIENT
Start: 2024-12-31 | End: 2024-12-31

## 2024-12-31 RX ADMIN — TESTOSTERONE CYPIONATE 4 MG: 200 INJECTION INTRAMUSCULAR at 03:25

## 2024-12-31 RX ADMIN — BUPIVACAINE HYDROCHLORIDE 0.5 ML: 5 INJECTION, SOLUTION PERINEURAL at 03:25

## 2024-12-31 RX ADMIN — TRIAMCINOLONE ACETONIDE 20 MG: 40 INJECTION, SUSPENSION INTRA-ARTICULAR; INTRAMUSCULAR at 03:25

## 2024-12-31 NOTE — PROGRESS NOTES
[facility] - PODIATRY    Today's Date: 24    Patient Name: Sarthak Gamboa  MRN: [id number]  CSN: [id number]  PCP: Man Mckenzie MD  Referring Provider: Man Mckenzie*    SUBJECTIVE     Chief Complaint   Patient presents with    Right Foot - Establish Care, Pain     Heel pain x 1 yr previously seen in 2019 for plantar fascitis    Left Foot - Establish Care, Pain     Heel pain     HPI: Sarthak Gamboa, a 39 y.o.male, comes to clinic.    New, Established, New Problem: New    Location: Bilateral heels    Duration: 5 years    Onset:  gradual    Nature:  achy, sharp, shooting    Aggravating factors:  Patient describes morning bilateral heel pain as stabbing, burning, or aching. This pain usually subsides throughout the day, however it returns after periods of rest and sitting, when standing back up on their feet, and again the next morning.      Previous Treatment: Injections and NSAIDs    Patient denies any fevers, chills, nausea, vomiting, shortness of breath, nor any other constitutional signs nor symptoms.    No other pedal complaints at this time.    History reviewed. No pertinent past medical history.  Past Surgical History:   Procedure Laterality Date    ABSCESS DRAINAGE Left 2017    Incision and drainage of simple abscess on left thigh-Dr. Kamlesh Maldonado     History reviewed. No pertinent family history.  Social History     Socioeconomic History    Marital status: Single   Tobacco Use    Smoking status: Some Days     Current packs/day: 0.00     Average packs/day: 0.5 packs/day for 1 year (0.5 ttl pk-yrs)     Types: Cigarettes     Start date: 2021     Last attempt to quit: 2022     Years since quittin.3     Passive exposure: Never    Smokeless tobacco: Never   Vaping Use    Vaping status: Never Used   Substance and Sexual Activity    Alcohol use: No    Drug use: No    Sexual activity: Defer     No Known Allergies  No current outpatient medications on file.      Current Facility-Administered Medications   Medication Dose Route Frequency Provider Last Rate Last Admin    bupivacaine (MARCAINE) 0.5 % injection 0.5 mL  0.5 mL Injection Once Jones Mckinney DPM        bupivacaine (MARCAINE) 0.5 % injection 0.5 mL  0.5 mL Injection Once Jones Mckinney DPM        dexAMETHasone sodium phosphate injection 4 mg  4 mg Intra-articular Once Jones Mckinney DPM        dexAMETHasone sodium phosphate injection 4 mg  4 mg Intra-articular Once Jones Mckinney DPM        triamcinolone acetonide (KENALOG-40) injection 20 mg  20 mg Intramuscular Once Jones Mckinney DPM        triamcinolone acetonide (KENALOG-40) injection 20 mg  20 mg Intramuscular Once Jones Mckinney DPM         Review of Systems   Constitutional: Negative.    Musculoskeletal:         Bilateral heel pain   All other systems reviewed and are negative.      OBJECTIVE     Vitals:    12/31/24 1449   BP: 132/83   Pulse: 81   Temp: 97.8 °F (36.6 °C)   SpO2: 97%       PHYSICAL EXAM     Foot/Ankle Exam    GENERAL  Appearance:  appears stated age  Orientation:  AAOx3  Affect:  appropriate  Gait:  unimpaired  Assistance:  independent  Right shoe gear: casual shoe  Left shoe gear: casual shoe    VASCULAR     Right Foot Vascularity   Normal vascular exam    Dorsalis pedis:  2+  Posterior tibial:  2+  Skin temperature:  warm  Edema grading:  None  CFT:  < 3 seconds  Pedal hair growth:  Present  Varicosities:  none     Left Foot Vascularity   Normal vascular exam    Dorsalis pedis:  2+  Posterior tibial:  2+  Skin temperature:  warm  Edema grading:  None  CFT:  < 3 seconds  Pedal hair growth:  Present  Varicosities:  none     NEUROLOGIC     Right Foot Neurologic   Normal sensation    Light touch sensation: normal  Vibratory sensation: normal  Hot/Cold sensation: normal     Left Foot Neurologic   Normal sensation    Light touch sensation: normal  Vibratory sensation: normal  Hot/Cold  sensation:  normal    MUSCULOSKELETAL     Right Foot Musculoskeletal   Ecchymosis:  none  Tenderness:  plantar fascia tenderness       Left Foot Musculoskeletal   Ecchymosis:  none  Tenderness:  plantar fascia tenderness    MUSCLE STRENGTH     Right Foot Muscle Strength   Foot dorsiflexion:  4  Foot plantar flexion:  4  Foot inversion:  4  Foot eversion:  4     Left Foot Muscle Strength   Foot dorsiflexion:  4  Foot plantar flexion:  4  Foot inversion:  4  Foot eversion:  4    RANGE OF MOTION     Right Foot Range of Motion   Foot and ankle ROM within normal limits       Left Foot Range of Motion   Foot and ankle ROM within normal limits      DERMATOLOGIC      Right Foot Dermatologic   Skin  Right foot skin is intact.   Nails comment:  Toenails 1, 2, 3, 4, and 5     Left Foot Dermatologic   Skin  Left foot skin is intact.   Nails comment:  Toenails 1, 2, 3, 4, and 5    ASSESSMENT/PLAN     Diagnoses and all orders for this visit:    1. Foot pain, bilateral (Primary)    2. Plantar fasciitis  -     dexAMETHasone sodium phosphate injection 4 mg  -     bupivacaine (MARCAINE) 0.5 % injection 0.5 mL  -     triamcinolone acetonide (KENALOG-40) injection 20 mg  -     dexAMETHasone sodium phosphate injection 4 mg  -     bupivacaine (MARCAINE) 0.5 % injection 0.5 mL  -     triamcinolone acetonide (KENALOG-40) injection 20 mg      Comprehensive lower extremity examination and evaluation was performed.    Discussed findings and treatment plan including risks, benefits, and treatment options with patient in detail. Patient agreed with treatment plan.    Plantar Fasciits Injection:    Date/Time: 12/31/2024  Performed by: Jones Mckinney DPM  Authorized by: Jones Mckinney DPM     Consent: Verbal consent obtained. Written consent obtained.  Risks and benefits: risks, benefits and alternatives were discussed  Consent given by: patient  Patient identity confirmed: verbally with patient  Indications: pain relief    Injection  "site: Bilateral heels.    Sedation:  none    Patient position: sitting  Needle size: 27 G, 1 1/4\" in length  Injection medications:  0.5 ml 0.5% Marcaine plain, 0.5 ml Kenalog 40 mg/ml, and 0.5 ml Decadron 4 mg/mL.   Outcome: pain improved    Patient tolerated the procedure well with no immediate complications.    Treatment Options discussed:  - no treatment at all  - change in shoegear  - change in activities  - RICE therapy  - arch support  - NSAIDs  - PO steroids  - injectable steroids    Patient may begin to weight bear as tolerated in supportive shoes.  No impact activities for two weeks.  After that time, the patient may increase activities as tolerated. Patient states understanding and agreement with this plan.    Rice Therapy: It is important to treat any injury as soon as possible to help control swelling and increase recovery time. The recognized regimen for immediate treatment of sport injuries includes rest, ice (cold application), compression, and elevation (RICE). Remove the injured athlete from play, apply ice to the affected area, wrap or compress the injured area with an elastic bandage when appropriate, and elevate the injured area above heart level to reduce swelling.  The patient is to not use ice for longer than 20 minutes at a time, with at least 20 minutes of no ice usage between applications.     Patient instructed use OTC analgesics with dosing per package insert as needed.      The patient states understanding and agreement with this plan.    Patient is to monitor for recurrence and any new symptoms and to contact Dr. Mckinney's office for a follow-up appointment.      The patient states understanding and agreement with this plan.    An After Visit Summary was printed and given to the patient at discharge, including (if requested) any available informative/educational handouts regarding diagnosis, treatment, or medications. All questions were answered to patient/family satisfaction. Should " symptoms fail to improve or worsen they agree to call or return to clinic or to go to the Emergency Department. Discussed the importance of following up with any needed screening tests/labs/specialist appointments and any requested follow-up recommended by me today. Importance of maintaining follow-up discussed and patient accepts that missed appointments can delay diagnosis and potentially lead to worsening of conditions.    Return if symptoms worsen or fail to improve., or sooner if acute issues arise.    This document has been electronically signed by Jones Mckinney DPM on December 31, 2024 15:39 EST

## 2025-02-04 ENCOUNTER — OFFICE VISIT (OUTPATIENT)
Dept: FAMILY MEDICINE CLINIC | Age: 40
End: 2025-02-04
Payer: COMMERCIAL

## 2025-02-04 VITALS
HEIGHT: 73 IN | WEIGHT: 239 LBS | SYSTOLIC BLOOD PRESSURE: 134 MMHG | HEART RATE: 73 BPM | TEMPERATURE: 97.9 F | DIASTOLIC BLOOD PRESSURE: 98 MMHG | OXYGEN SATURATION: 96 % | BODY MASS INDEX: 31.68 KG/M2

## 2025-02-04 DIAGNOSIS — J02.0 STREP PHARYNGITIS: Primary | ICD-10-CM

## 2025-02-04 DIAGNOSIS — R10.11 RIGHT UPPER QUADRANT PAIN: ICD-10-CM

## 2025-02-04 DIAGNOSIS — J02.9 SORE THROAT: ICD-10-CM

## 2025-02-04 DIAGNOSIS — H61.22 IMPACTED CERUMEN OF LEFT EAR: ICD-10-CM

## 2025-02-04 DIAGNOSIS — R11.0 NAUSEA: ICD-10-CM

## 2025-02-04 LAB
EXPIRATION DATE: ABNORMAL
EXPIRATION DATE: NORMAL
EXPIRATION DATE: NORMAL
FLUAV AG UPPER RESP QL IA.RAPID: NOT DETECTED
FLUBV AG UPPER RESP QL IA.RAPID: NOT DETECTED
HETEROPH AB SER QL LA: NEGATIVE
INTERNAL CONTROL: ABNORMAL
INTERNAL CONTROL: NORMAL
INTERNAL CONTROL: NORMAL
Lab: ABNORMAL
Lab: NORMAL
Lab: NORMAL
S PYO AG THROAT QL: POSITIVE
SARS-COV-2 AG UPPER RESP QL IA.RAPID: NOT DETECTED

## 2025-02-04 PROCEDURE — 86308 HETEROPHILE ANTIBODY SCREEN: CPT | Performed by: NURSE PRACTITIONER

## 2025-02-04 PROCEDURE — 87880 STREP A ASSAY W/OPTIC: CPT | Performed by: NURSE PRACTITIONER

## 2025-02-04 PROCEDURE — 87428 SARSCOV & INF VIR A&B AG IA: CPT | Performed by: NURSE PRACTITIONER

## 2025-02-04 PROCEDURE — 99213 OFFICE O/P EST LOW 20 MIN: CPT | Performed by: NURSE PRACTITIONER

## 2025-02-04 RX ORDER — AMOXICILLIN 500 MG/1
500 TABLET, FILM COATED ORAL 2 TIMES DAILY
Qty: 20 TABLET | Refills: 0 | Status: SHIPPED | OUTPATIENT
Start: 2025-02-04 | End: 2025-02-07

## 2025-02-04 NOTE — PROGRESS NOTES
"Sarthak Gamboa presents to [facility] with complaint of  Abdominal Pain (RUQ abd pain X 1 week off and on, worsened yesterday), Nausea (Nausea without vomiting ), Earache (LT ear pain X 2 days), and Sore Throat (X 1 day/Exposed to strep, mono , & flu )    SUBJECTIVE  History of Present Illness    Patient is being seen today for evaluation of nausea, left earache, sore throat.  He is also having right upper quadrant abdominal pain.  Abdominal pain has been present for 1 week.  Pain comes and goes but was significantly worse yesterday and is now constant stabbing pain.  He has not had any vomiting or diarrhea.  He does endorse nausea.    Patient sore throat started yesterday.  He has been exposed to strep, mono, and flu A.  His son currently has these infections.  Patient has been afebrile.  He is eating and drinking normally.    Left ear pain started 2 days ago.  Pressure feeling, no drainage reported.      OBJECTIVE  Vital Signs:   /98 (BP Location: Left arm, Patient Position: Sitting, Cuff Size: Adult)   Pulse 73   Temp 97.9 °F (36.6 °C) (Oral)   Ht 185.4 cm (72.99\")   Wt 108 kg (239 lb)   SpO2 96%   BMI 31.54 kg/m²       Physical Exam  Constitutional:       General: He is not in acute distress.     Appearance: Normal appearance. He is not ill-appearing.   HENT:      Head: Normocephalic and atraumatic.      Right Ear: Tympanic membrane and ear canal normal.      Left Ear: There is impacted cerumen.      Nose: Nose normal.      Mouth/Throat:      Pharynx: Uvula midline. Pharyngeal swelling, posterior oropharyngeal erythema and uvula swelling present.      Tonsils: No tonsillar exudate. 2+ on the right. 2+ on the left.   Cardiovascular:      Rate and Rhythm: Normal rate and regular rhythm.      Pulses: Normal pulses.      Heart sounds: Normal heart sounds.   Pulmonary:      Effort: Pulmonary effort is normal. No respiratory distress.   Chest:      Chest wall: No tenderness. "   Abdominal:      Palpations: Abdomen is soft.      Tenderness: There is abdominal tenderness in the right upper quadrant. There is no guarding or rebound. Positive signs include Montgomery's sign. Negative signs include McBurney's sign.   Musculoskeletal:         General: Normal range of motion.      Cervical back: Normal range of motion and neck supple.   Skin:     General: Skin is warm and dry.   Neurological:      General: No focal deficit present.      Mental Status: He is alert and oriented to person, place, and time. Mental status is at baseline.   Psychiatric:         Mood and Affect: Mood normal.         Behavior: Behavior normal.          Results Review:  The following data was reviewed by MIL Eaton [unfilled] 14:35 EST.    Office Visit on 02/04/2025   Component Date Value Ref Range Status    SARS Antigen 02/04/2025 Not Detected  Not Detected, Presumptive Negative Final    Influenza A Antigen NICKI 02/04/2025 Not Detected  Not Detected Final    Influenza B Antigen NICKI 02/04/2025 Not Detected  Not Detected Final    Internal Control 02/04/2025 Passed  Passed Final    Lot Number 02/04/2025 [id number]   Final    Expiration Date 02/04/2025 7-30-25   Final    Rapid Strep A Screen 02/04/2025 Positive (A)  Negative, VALID, INVALID, Not Performed Final    Internal Control 02/04/2025 Passed  Passed Final    Lot Number 02/04/2025 [id number]   Final    Expiration Date 02/04/2025 11-30-25   Final    Monospot 02/04/2025 Negative  Negative Final    Internal Control 02/04/2025 Passed  Passed Final    Lot Number 02/04/2025 [id number]   Final    Expiration Date 02/04/2025 8-31-25   Final           ASSESSMENT AND PLAN:  Diagnoses and all orders for this visit:    1. Strep pharyngitis (Primary)  -     POCT rapid strep A    2. Nausea  -     POCT SARS-CoV-2 Antigen NICKI + Flu    3. Sore throat  -     POCT Infectious mononucleosis antibody    4. Impacted cerumen of left ear    5. Right upper quadrant pain    Other orders  -     amoxicillin  (AMOXIL) 500 MG tablet; Take 1 tablet by mouth 2 (Two) Times a Day for 10 days.  Dispense: 20 tablet; Refill: 0      Patient was negative for flu COVID and mono.  He is positive for strep.  Will treat strep with amoxicillin.  He was advised that he is contagious until he has been on antibiotic for 24 hours.  Change toothbrush in 48 hours.  Work note was provided.  Do not recommend ear irrigation during acute illness.  He can return to the office for this when he is feeling better.  He does have abdominal pain with palpation today and borderline positive Montgomery sign.  For this reason, he was instructed to seek emergency care to rule out acute cholecystitis.  Suspicion for this is low given that he has been afebrile, is not having vomiting or diarrhea.  He plans on going to [facility] today for his abdominal pain.      Follow Up   No follow-ups on file. Patient to notify office with any acute concerns or issues.  Patient verbalizes understanding, agrees with plan of care and has no further questions upon discharge.     Patient was given instructions and counseling regarding his condition or for health maintenance advice. Please see specific information pulled into the AVS if appropriate.     Discussed the importance of following up with any needed screening tests/labs/specialist appointments and any requested follow-up recommended by me today. Importance of maintaining follow-up discussed and patient accepts that missed appointments can delay diagnosis and potentially lead to worsening of conditions.    Part of this note may be an electronic transcription/translation of spoken language to printed text using the Dragon Dictation System.

## 2025-02-04 NOTE — LETTER
February 4, 2025     Patient: Sarthak Gamboa   YOB: 1985   Date of Visit: 2/4/2025       To Whom It May Concern:    It is my medical opinion that Sarthak Gamboa may return to work 2/6/24.       Sincerely,          MIL Eaton    CC: No Recipients

## 2025-02-05 ENCOUNTER — TELEPHONE (OUTPATIENT)
Dept: FAMILY MEDICINE CLINIC | Age: 40
End: 2025-02-05
Payer: COMMERCIAL

## 2025-02-05 NOTE — TELEPHONE ENCOUNTER
Patient inf that provider is out of the office today and will return on 2/6/25, will forward to A [place] provider that seen him on 2/4/25

## 2025-02-05 NOTE — TELEPHONE ENCOUNTER
Caller: Sarthak Gamboa    Relationship: Self    Best call back number: [phone]     What form or medical record are you requesting: WORK EXCUSE TO EXTEND HIS RETURN TO WORK FROM 2/6 TO 2/7 INSTEAD    Who is requesting this form or medical record from you: SELF    How would you like to receive the form or medical records (pick-up, mail, fax):      Timeframe paperwork needed: ASAP    Additional notes: PATIENT STATES HIS CURRENT NOTE HAS HIM RETURNING TO WORK TOMORROW BUT HE NEEDS TO BE OFF UNTIL FRIDAY BECAUSE HE STILL FEELS BAD.

## 2025-02-06 NOTE — TELEPHONE ENCOUNTER
Caller: Sarthak Gamboa    Relationship: Self    Best call back number: [phone]     What is the best time to reach you: ANY     What was the call regarding: PATIENT WAS SEEN TUESDAY 02/04 BY LEXIE GARCIA-  AND WOULD LIKE WORK EXCUSE FOR REST OF THE WEEK..  TO RETURN TO WORK ON 02/10,   IF HE NEEDS TO COME BACK IN, PLEASE CALL AND LET HIM KNOW      Is it okay if the provider responds through [facility]: YES, PLEASE UPLOAD EXCUSE TO [facility]

## 2025-02-06 NOTE — TELEPHONE ENCOUNTER
Patient states he is still having fever 102 oral last night, cough, fatigue and wants to extend his work note until Monday 2/10/24

## 2025-02-07 ENCOUNTER — OFFICE VISIT (OUTPATIENT)
Dept: FAMILY MEDICINE CLINIC | Age: 40
End: 2025-02-07
Payer: COMMERCIAL

## 2025-02-07 VITALS
OXYGEN SATURATION: 98 % | SYSTOLIC BLOOD PRESSURE: 150 MMHG | TEMPERATURE: 97.4 F | HEIGHT: 73 IN | DIASTOLIC BLOOD PRESSURE: 100 MMHG | HEART RATE: 92 BPM | WEIGHT: 239 LBS | BODY MASS INDEX: 31.68 KG/M2

## 2025-02-07 DIAGNOSIS — J02.0 STREP PHARYNGITIS: Primary | ICD-10-CM

## 2025-02-07 DIAGNOSIS — R10.11 RUQ ABDOMINAL PAIN: ICD-10-CM

## 2025-02-07 DIAGNOSIS — R09.89 SUSPECTED NOVEL INFLUENZA A VIRUS INFECTION: ICD-10-CM

## 2025-02-07 PROCEDURE — 99213 OFFICE O/P EST LOW 20 MIN: CPT | Performed by: NURSE PRACTITIONER

## 2025-02-07 RX ORDER — CEFDINIR 300 MG/1
300 CAPSULE ORAL 2 TIMES DAILY
Qty: 20 CAPSULE | Refills: 0 | Status: SHIPPED | OUTPATIENT
Start: 2025-02-07 | End: 2025-02-17

## 2025-02-07 NOTE — PROGRESS NOTES
"Sarthak Gamboa presents to [facility] with complaint of  Sore Throat (X 4 days ), Nausea (Nausea with vomiting since lastnight ), Fever (Fever at night X 2 ), Abdominal Pain (RUQ pain ), and Diarrhea (X 4 days )    SUBJECTIVE  History of Present Illness    Patient is following up in office today for 4-day history of sore throat.  He was seen here in the office 3 days ago and was positive for strep.  He was started on amoxicillin.  Patient says that his sore throat has not improved at all.  He has son who has known flu and mono.  Patient tested negative for these when he was here 3 days ago.  He continues to have nausea, has had 1 episode of vomiting, associated with right upper quadrant pain.  He has also had intermittent diarrhea for the past 4 days.  Patient was going to go to the emergency room a couple of days ago for his abdominal pain for concern of cholecystitis however he says the emergency room wait time was too long.  He does not rate his pain as being severe.  Patient reports he is running a fever but has not actually checked his temperature at home.    OBJECTIVE  Vital Signs:   /100 (BP Location: Left arm, Patient Position: Sitting, Cuff Size: Adult)   Pulse 92   Temp 97.4 °F (36.3 °C) (Oral)   Ht 185.4 cm (72.99\")   Wt 108 kg (239 lb)   SpO2 98%   BMI 31.54 kg/m²       Physical Exam  Constitutional:       General: He is not in acute distress.     Appearance: Normal appearance. He is ill-appearing (Hoarse voice).   HENT:      Head: Normocephalic and atraumatic.      Nose: Nose normal.      Mouth/Throat:      Pharynx: Uvula midline. Pharyngeal swelling, posterior oropharyngeal erythema and uvula swelling present. No oropharyngeal exudate.      Tonsils: No tonsillar exudate. 2+ on the right. 2+ on the left.   Cardiovascular:      Rate and Rhythm: Normal rate and regular rhythm.      Pulses: Normal pulses.      Heart sounds: Normal heart sounds.   Pulmonary:      " Effort: Pulmonary effort is normal. No respiratory distress.      Breath sounds: Normal breath sounds.   Chest:      Chest wall: No tenderness.   Musculoskeletal:         General: Normal range of motion.      Cervical back: Normal range of motion and neck supple.   Skin:     General: Skin is warm and dry.   Neurological:      General: No focal deficit present.      Mental Status: He is alert and oriented to person, place, and time. Mental status is at baseline.   Psychiatric:         Mood and Affect: Mood normal.         Behavior: Behavior normal.            ASSESSMENT AND PLAN:  Diagnoses and all orders for this visit:    1. Strep pharyngitis (Primary)    2. RUQ abdominal pain  -     US Gallbladder; Future    3. Suspected novel influenza A virus infection    Other orders  -     cefdinir (OMNICEF) 300 MG capsule; Take 1 capsule by mouth 2 (Two) Times a Day for 10 days.  Dispense: 20 capsule; Refill: 0      Since patient is not seeing improvement in his throat pain and his tonsils are still significantly swollen, will switch to cefdinir.  Stop taking the amoxicillin.  Discussed with patient that it is possible he may now test positive for influenza A.  Ultrasound of gallbladder was ordered.  Patient admits to not eating and drinking.  He was encouraged to start pushing fluids.  He is at risk for dehydration.  Eat brat diet.  May use Imodium for diarrhea.  He understands emergency room precautions.  Work note was extended.           Follow Up   Return if symptoms worsen or fail to improve. Patient to notify office with any acute concerns or issues.  Patient verbalizes understanding, agrees with plan of care and has no further questions upon discharge.     Patient was given instructions and counseling regarding his condition or for health maintenance advice. Please see specific information pulled into the AVS if appropriate.     Discussed the importance of following up with any needed screening tests/labs/specialist  appointments and any requested follow-up recommended by me today. Importance of maintaining follow-up discussed and patient accepts that missed appointments can delay diagnosis and potentially lead to worsening of conditions.    Part of this note may be an electronic transcription/translation of spoken language to printed text using the Dragon Dictation System.

## 2025-02-07 NOTE — LETTER
February 7, 2025     Patient: Sarthak Gamboa   YOB: 1985   Date of Visit: 2/7/2025       To Whom It May Concern:    It is my medical opinion that Sarthak Gamboa may be excused from work 2/6/25 & 2/7/25.He is able to return Monday, 2/10/25.          Sincerely,        MIL Eaton    CC: No Recipients

## 2025-02-11 ENCOUNTER — HOSPITAL ENCOUNTER (OUTPATIENT)
Dept: GENERAL RADIOLOGY | Facility: HOSPITAL | Age: 40
Discharge: HOME | End: 2025-02-11
Admitting: STUDENT IN AN ORGANIZED HEALTH CARE EDUCATION/TRAINING PROGRAM
Payer: COMMERCIAL

## 2025-02-11 ENCOUNTER — OFFICE VISIT (OUTPATIENT)
Dept: FAMILY MEDICINE CLINIC | Age: 40
End: 2025-02-11
Payer: COMMERCIAL

## 2025-02-11 VITALS
BODY MASS INDEX: 31.51 KG/M2 | OXYGEN SATURATION: 96 % | TEMPERATURE: 97.6 F | HEART RATE: 75 BPM | WEIGHT: 237.8 LBS | HEIGHT: 73 IN | DIASTOLIC BLOOD PRESSURE: 98 MMHG | SYSTOLIC BLOOD PRESSURE: 135 MMHG

## 2025-02-11 DIAGNOSIS — R10.11 RIGHT UPPER QUADRANT ABDOMINAL PAIN: Primary | ICD-10-CM

## 2025-02-11 DIAGNOSIS — R10.11 RIGHT UPPER QUADRANT ABDOMINAL PAIN: ICD-10-CM

## 2025-02-11 DIAGNOSIS — H66.90 ACUTE OTITIS MEDIA, UNSPECIFIED OTITIS MEDIA TYPE: ICD-10-CM

## 2025-02-11 DIAGNOSIS — R11.0 NAUSEA: ICD-10-CM

## 2025-02-11 PROCEDURE — 74018 RADEX ABDOMEN 1 VIEW: CPT

## 2025-02-11 PROCEDURE — 99213 OFFICE O/P EST LOW 20 MIN: CPT | Performed by: STUDENT IN AN ORGANIZED HEALTH CARE EDUCATION/TRAINING PROGRAM

## 2025-02-11 RX ORDER — ONDANSETRON 4 MG/1
4 TABLET, ORALLY DISINTEGRATING ORAL EVERY 8 HOURS PRN
Qty: 30 TABLET | Refills: 0 | Status: SHIPPED | OUTPATIENT
Start: 2025-02-11

## 2025-02-11 NOTE — LETTER
February 11, 2025     Patient: Sarthak Gamboa   YOB: 1985   Date of Visit: 2/11/2025       To Whom It May Concern:    It is my medical opinion that Sarthak Gamboa should be excused from work 2/11/25 and 2/12/25.         Sincerely,        MIL Hamilton    CC: No Recipients

## 2025-02-11 NOTE — PROGRESS NOTES
Chief Complaint     Abdominal Pain (Pt c/o soreness, in his (R) side of his stomach, feels like he has a balloon in his stomach. Stiff neck, diarrhea./Pt was negative for C/F and mono in office./Pt was Strep A pos)    History of Present Illness     Sarthak Gamboa is a 39 y.o. male who presents to [facility] with complaints of nasal congestion, fever (t max 102), sinus pressure, HA, left earache, sore throat, post nasal drip, nausea, diarrhea, SOA with exertion. Was seen in the office on 2/4/25-covid/flu negative strep positive. Right sided abdominal pain states a ultrasound was ordered at last visit (2/4/25) but has not heard about it yet. New symptoms include neck pain and half way down his back this pain just started today. Describes it as a sharp pain rates the pain 7/10. Feels like the back/neck pain is related to his abdominal pain. Pain is made worse with movement and when bending over. Diarrhea also started this morning, has been explosive watery diarrhea about once an hour.          History      No past medical history on file.    Past Surgical History:   Procedure Laterality Date    ABSCESS DRAINAGE Left 03/01/2017    Incision and drainage of simple abscess on left thigh-Dr. Kamlesh Maldonado       No family history on file.     Current Medications        Current Outpatient Medications:     cefdinir (OMNICEF) 300 MG capsule, Take 1 capsule by mouth 2 (Two) Times a Day for 10 days., Disp: 20 capsule, Rfl: 0    ondansetron ODT (ZOFRAN-ODT) 4 MG disintegrating tablet, Place 1 tablet on the tongue Every 8 (Eight) Hours As Needed for Nausea or Vomiting., Disp: 30 tablet, Rfl: 0     Allergies     No Known Allergies    Social History       Social History     Social History Narrative    Not on file       Immunizations     Immunization:    There is no immunization history on file for this patient.       Objective     Objective     Vital Signs:   /98 (BP Location: Right arm, Patient  "Position: Sitting)   Pulse 75   Temp 97.6 °F (36.4 °C) (Oral)   Ht 185.4 cm (72.99\")   Wt 108 kg (237 lb 12.8 oz)   SpO2 96% Comment: room air  BMI 31.38 kg/m²       Physical Exam  Vitals and nursing note reviewed.   Constitutional:       Appearance: Normal appearance. He is obese.   HENT:      Head: Normocephalic.      Right Ear: Tympanic membrane, ear canal and external ear normal.      Left Ear: Ear canal and external ear normal. Tympanic membrane is erythematous.      Nose: Nose normal.      Mouth/Throat:      Lips: Pink.      Mouth: Mucous membranes are moist.      Pharynx: Uvula midline.   Eyes:      Conjunctiva/sclera: Conjunctivae normal.      Pupils: Pupils are equal, round, and reactive to light.   Cardiovascular:      Rate and Rhythm: Normal rate and regular rhythm.      Pulses: Normal pulses.      Heart sounds: Normal heart sounds.   Pulmonary:      Effort: Pulmonary effort is normal.      Breath sounds: Normal breath sounds.   Abdominal:      General: Bowel sounds are normal.      Palpations: Abdomen is soft.      Tenderness: There is abdominal tenderness in the right upper quadrant. Positive signs include Montgomery's sign.   Musculoskeletal:         General: Normal range of motion.      Cervical back: Normal range of motion and neck supple.   Lymphadenopathy:      Cervical: No cervical adenopathy.   Skin:     General: Skin is warm and dry.   Neurological:      General: No focal deficit present.      Mental Status: He is alert and oriented to person, place, and time.   Psychiatric:         Attention and Perception: Attention normal.         Mood and Affect: Mood and affect normal.         Behavior: Behavior normal. Behavior is cooperative.         Results    The following data was reviewed by: MIL Hamilton on 02/12/25                XR Abdomen KUB (02/11/2025 17:21)   Assessment and Plan        Assessment and Plan       Right upper quadrant abdominal pain    Orders:    XR Abdomen KUB; " Future  Gave the number to call for diagnostic scheduling to try to get his ultrasound scheduled.  Acute otitis media, unspecified otitis media type  Already on cefdinir for strep, instructed to finish this.       Nausea    Orders:    ondansetron ODT (ZOFRAN-ODT) 4 MG disintegrating tablet; Place 1 tablet on the tongue Every 8 (Eight) Hours As Needed for Nausea or Vomiting.       Educated on hospital precautions.        Follow Up        Follow Up   Return in about 4 weeks (around 3/11/2025) for With PCP, sooner if condition worsens.  Patient was given instructions and counseling regarding his condition or for health maintenance advice. Please see specific information pulled into the AVS if appropriate.      Part of this note may be an electronic transcription/translation of spoken language to printed text using the Dragon dictation system.

## 2025-02-17 ENCOUNTER — OFFICE VISIT (OUTPATIENT)
Dept: FAMILY MEDICINE CLINIC | Age: 40
End: 2025-02-17
Payer: COMMERCIAL

## 2025-02-17 VITALS
WEIGHT: 239.6 LBS | OXYGEN SATURATION: 98 % | HEART RATE: 68 BPM | DIASTOLIC BLOOD PRESSURE: 90 MMHG | HEIGHT: 73 IN | BODY MASS INDEX: 31.75 KG/M2 | TEMPERATURE: 97.5 F | SYSTOLIC BLOOD PRESSURE: 143 MMHG

## 2025-02-17 DIAGNOSIS — R10.11 RIGHT UPPER QUADRANT PAIN: ICD-10-CM

## 2025-02-17 DIAGNOSIS — M72.2 BILATERAL PLANTAR FASCIITIS: ICD-10-CM

## 2025-02-17 DIAGNOSIS — S02.40CA: ICD-10-CM

## 2025-02-17 DIAGNOSIS — R93.5 ABNORMAL ABDOMINAL CT SCAN: Primary | ICD-10-CM

## 2025-02-17 PROCEDURE — 99214 OFFICE O/P EST MOD 30 MIN: CPT | Performed by: FAMILY MEDICINE

## 2025-02-17 NOTE — PROGRESS NOTES
"Chief Complaint  Rectal Bleeding (Still having pains in right side)    Subjective          Sarthak Gamboa presents to [facility]  History of Present Illness  --SAW PODIATRY FOR THE PLANTAR FASCIITIS, IMPROVED WITH INJECTIONS  --SAW ENT FOR THE MAXILLARY SINUS FRACTURE, NO TREATMENT OR FOLLOW UP IS PLANNED AT THIS TIME.  --INTERMITTENT RUQ PAIN, STATES NEGATIVE HIDA SCAN IN THE PAST.  WAS TO HAVE HAD AN U/S BUT WENT TO THE ER WHERE A CT REVEALED SOME THICKENING OF THE DISTAL ILEUM AND PROSIMAL ASCENDING COLON.          No Known Allergies     Health Maintenance Due   Topic Date Due    Pneumococcal Vaccine 0-49 (1 of 2 - PCV) Never done    TDAP/TD VACCINES (1 - Tdap) Never done    HEPATITIS C SCREENING  Never done    ANNUAL PHYSICAL  Never done    INFLUENZA VACCINE  Never done    COVID-19 Vaccine (1 - 2024-25 season) Never done        Current Outpatient Medications on File Prior to Visit   Medication Sig    ondansetron ODT (ZOFRAN-ODT) 4 MG disintegrating tablet Place 1 tablet on the tongue Every 8 (Eight) Hours As Needed for Nausea or Vomiting.     No current facility-administered medications on file prior to visit.         There is no immunization history on file for this patient.    Review of Systems   Constitutional:  Negative for activity change, appetite change, chills, fatigue and fever.   HENT:  Negative for congestion, ear pain, rhinorrhea and sore throat.    Respiratory:  Negative for cough and shortness of breath.    Cardiovascular:  Negative for chest pain, palpitations and leg swelling.   Gastrointestinal:  Negative for abdominal pain, constipation, diarrhea, nausea and vomiting.   Musculoskeletal:  Negative for arthralgias and myalgias.   Neurological:  Negative for headache.        Objective     /90 (BP Location: Left arm, Patient Position: Sitting)   Pulse 68   Temp 97.5 °F (36.4 °C) (Oral)   Ht 185.4 cm (73\")   Wt 109 kg (239 lb 9.6 oz)   SpO2 98% Comment: on " room air  BMI 31.61 kg/m²       Physical Exam  Vitals and nursing note reviewed.   Constitutional:       General: He is not in acute distress.     Appearance: Normal appearance.   Cardiovascular:      Rate and Rhythm: Normal rate and regular rhythm.      Heart sounds: Normal heart sounds. No murmur heard.  Pulmonary:      Effort: Pulmonary effort is normal.      Breath sounds: Normal breath sounds.   Abdominal:      Palpations: Abdomen is soft.      Tenderness: There is no abdominal tenderness.   Musculoskeletal:      Cervical back: Neck supple.      Right lower leg: No edema.      Left lower leg: No edema.   Lymphadenopathy:      Cervical: No cervical adenopathy.   Neurological:      General: No focal deficit present.      Mental Status: He is alert.      Cranial Nerves: No cranial nerve deficit.      Coordination: Coordination normal.      Gait: Gait normal.   Psychiatric:         Mood and Affect: Mood normal.         Behavior: Behavior normal.         Result Review :                             Assessment and Plan      Diagnoses and all orders for this visit:    1. Abnormal abdominal CT scan (Primary)  Assessment & Plan:  WILL GET HIM IN WITH GENERAL SURGERY ASAP.      Orders:  -     Ambulatory Referral to General Surgery    2. Right upper quadrant pain  Assessment & Plan:  WILL SEND TO SURGEON AS NOTED.  MAY  NEED A HIDA SCAN.      Orders:  -     Ambulatory Referral to General Surgery    3. Closed fracture of right maxillary sinus  Assessment & Plan:  OBSERVE FOR NOW AND WILL ASK ENT RE F/U.        4. Bilateral plantar fasciitis  Assessment & Plan:  IMPROVED CURRENTLY, PLANS PER PODIATRY                       Follow Up     Return in about 3 months (around 5/17/2025).    Patient was given instructions and counseling regarding his condition or for health maintenance advice. Please see specific information pulled into the AVS if appropriate.

## 2025-02-18 ENCOUNTER — PREP FOR SURGERY (OUTPATIENT)
Dept: OTHER | Facility: HOSPITAL | Age: 40
End: 2025-02-18
Payer: COMMERCIAL

## 2025-02-18 ENCOUNTER — OFFICE VISIT (OUTPATIENT)
Dept: SURGERY | Facility: CLINIC | Age: 40
End: 2025-02-18
Payer: COMMERCIAL

## 2025-02-18 VITALS — HEIGHT: 73 IN | WEIGHT: 239 LBS | BODY MASS INDEX: 31.68 KG/M2

## 2025-02-18 DIAGNOSIS — R93.5 ABNORMAL CT OF THE ABDOMEN: Primary | ICD-10-CM

## 2025-02-18 PROCEDURE — 99203 OFFICE O/P NEW LOW 30 MIN: CPT | Performed by: SURGERY

## 2025-02-18 RX ORDER — SODIUM, POTASSIUM,MAG SULFATES 17.5-3.13G
SOLUTION, RECONSTITUTED, ORAL ORAL
Qty: 177 ML | Refills: 0 | Status: SHIPPED | OUTPATIENT
Start: 2025-02-18

## 2025-02-18 NOTE — PROGRESS NOTES
General Surgery/Colorectal Surgery Note    Patient Name:  Sarthak Gamboa  YOB: 1985  [id number]    Referring Provider: Man Mckenzie*      Patient Care Team:  Man Mckenzie MD as PCP - General (Family Medicine)    Chief complaint abdominal pain    Subjective .     History of present illness:    History of right upper quadrant abdominal pain radiating to his lateral abdomen for the past few weeks.  No history of the same.  Pain constant.  No relationship to food.  No fever.  Positive nausea.  No vomiting.  Loose bowel movements once per day.  Painless hematochezia recently.  Associated mucus in his stool.  No history of ulcer disease.  No NSAID abuse.  No significant weight loss.  No previous abdominal surgery.  Positive tobacco abuse.  No recent chest pain.  No blood thinner use.  Colonoscopy 8 years ago with polyps.    CT abdomen and pelvis at outside facility with thickening of the terminal ileum and thickening of the right colon, questionable inflammatory bowel disease 2025    History:  History reviewed. No pertinent past medical history.    Past Surgical History:   Procedure Laterality Date    ABSCESS DRAINAGE Left 2017    Incision and drainage of simple abscess on left thigh-Dr. Kamlesh Maldonado       History reviewed. No pertinent family history.    Social History     Tobacco Use    Smoking status: Some Days     Current packs/day: 0.00     Average packs/day: 0.5 packs/day for 1 year (0.5 ttl pk-yrs)     Types: Cigarettes     Start date: 2021     Last attempt to quit: 2022     Years since quittin.4     Passive exposure: Never    Smokeless tobacco: Never   Vaping Use    Vaping status: Never Used   Substance Use Topics    Alcohol use: No    Drug use: No       Review of Systems  All systems were reviewed and negative except for:   Review of Systems   Constitutional: Negative for chills, fever and unexpected weight loss.   HENT: Negative for congestion,  nosebleeds and voice change.    Eyes: Negative for blurred vision, double vision and discharge.   Respiratory: Negative for apnea, chest tightness and shortness of breath.    Cardiovascular: Negative for chest pain and leg swelling.   Gastrointestinal:        See HPI   Endocrine: Negative for cold intolerance and heat intolerance.   Genitourinary: Negative for dysuria, hematuria and urgency.   Musculoskeletal: Negative for back pain, joint swelling and neck pain.   Skin: Negative for color change and dry skin.   Neurological: Negative for dizziness and confusion.   Hematological: Negative for adenopathy.   Psychiatric/Behavioral: Negative for agitation and behavioral problems.     MEDS:  Prior to Admission medications    Medication Sig Start Date End Date Taking? Authorizing Provider   ondansetron ODT (ZOFRAN-ODT) 4 MG disintegrating tablet Place 1 tablet on the tongue Every 8 (Eight) Hours As Needed for Nausea or Vomiting. 2/11/25  Yes Clara Harrison APRN   cefdinir (OMNICEF) 300 MG capsule Take 1 capsule by mouth 2 (Two) Times a Day for 10 days. 2/7/25 2/17/25  Chana Garner APRN   sodium-potassium-magnesium sulfates (SUPREP) 17.5-3.13-1.6 GM/177ML solution oral solution Take as directed 2/18/25   Christo Coughlin MD        Allergies:  Patient has no known allergies.    Objective     Vital Signs        Physical Exam:     General Appearance:    Alert, cooperative, in no acute distress   Head:    Normocephalic, without obvious abnormality, atraumatic   Eyes:          Conjunctivae and sclerae normal, no icterus,     Ears:    Ears appear intact with no abnormalities noted   Throat:   No oral lesions, no thrush, oral mucosa moist   Neck:   No adenopathy, supple, trachea midline, no thyromegaly   Back:     No kyphosis present, no scoliosis present, no skin lesions,      erythema or scars, no tenderness to percussion or                   palpation,   range of motion normal   Lungs:     Clear to  "auscultation,respirations regular, even and                  unlabored    Heart:    Regular rhythm and normal rate, normal S1 and S2, no            murmur, no gallop, no rub, no click   Chest Wall:    No abnormalities observed   Abdomen:     Normal bowel sounds, no masses, no organomegaly, soft        non-tender, non-distended, no guarding, no rebound                tenderness   Rectal:        Extremities:   Moves all extremities well, no edema, no cyanosis, no             redness   Pulses:   Pulses palpable and equal bilaterally   Skin:   No bleeding, bruising or rash   Lymph nodes:   No palpable adenopathy   Neurologic:   A/o x 4 with no deficits       Results Review:   {Results Review:[id number]::\"I reviewed the patient's new clinical results.\"    LABS/IMAGING:  Results for orders placed or performed in visit on 02/04/25   POCT rapid strep A    Collection Time: 02/04/25  2:49 PM    Specimen: Swab   Result Value Ref Range    Rapid Strep A Screen Positive (A) Negative, VALID, INVALID, Not Performed    Internal Control Passed Passed    Lot Number [id number]     Expiration Date 11-30-25    POCT SARS-CoV-2 Antigen NICKI + Flu    Collection Time: 02/04/25  2:56 PM    Specimen: Swab   Result Value Ref Range    SARS Antigen Not Detected Not Detected, Presumptive Negative    Influenza A Antigen NICKI Not Detected Not Detected    Influenza B Antigen NICKI Not Detected Not Detected    Internal Control Passed Passed    Lot Number [id number]     Expiration Date 7-30-25    POCT Infectious mononucleosis antibody    Collection Time: 02/04/25  3:16 PM    Specimen: Blood   Result Value Ref Range    Monospot Negative Negative    Internal Control Passed Passed    Lot Number [id number]     Expiration Date 8-31-25         Result Review : Labs  Result Review  Imaging  Med Tab  Media     Assessment & Plan     Abnormal CT abdomen and pelvis, abdominal pain, mucus in his stool, rectal bleed    Discussion with patient.  CAT scan reviewed.  I recommended " colonoscopy for further evaluation with biopsies.  Benefits and alternatives discussed.  Risk procedure including bleeding, perforation, missing a polyp discussed.  All questions answered.  He agrees with the plan.  Orders placed.  Thank you for the consult.              This document has been electronically signed by Christo Coughlin MD  February 18, 2025 14:48 EST

## 2025-02-18 NOTE — LETTER
February 18, 2025     Man Mckenzie MD  [address]  [address]  [facility] [address]    Patient: Sarthak Gamboa   YOB: 1985   Date of Visit: 2/18/2025     Dear Man Mckenzie MD:       Thank you for referring Sarthak Gamboa to me for evaluation. Below are the relevant portions of my assessment and plan of care.    If you have questions, please do not hesitate to call me. I look forward to following Sarthak along with you.         Sincerely,        Christo Coughlin MD        CC: No Recipients    Christo Coughlin MD  02/18/25 1451  Sign when Signing Visit  General Surgery/Colorectal Surgery Note    Patient Name:  Sarthak Gamboa  YOB: 1985  [id number]    Referring Provider: Man Mckenzie*      Patient Care Team:  Man Mckenzie MD as PCP - General (Family Medicine)    Chief complaint abdominal pain    Subjective.     History of present illness:    History of right upper quadrant abdominal pain radiating to his lateral abdomen for the past few weeks.  No history of the same.  Pain constant.  No relationship to food.  No fever.  Positive nausea.  No vomiting.  Loose bowel movements once per day.  Painless hematochezia recently.  Associated mucus in his stool.  No history of ulcer disease.  No NSAID abuse.  No significant weight loss.  No previous abdominal surgery.  Positive tobacco abuse.  No recent chest pain.  No blood thinner use.  Colonoscopy 8 years ago with polyps.    CT abdomen and pelvis at outside facility with thickening of the terminal ileum and thickening of the right colon, questionable inflammatory bowel disease 2/13/2025    History:  History reviewed. No pertinent past medical history.    Past Surgical History:   Procedure Laterality Date   • ABSCESS DRAINAGE Left 03/01/2017    Incision and drainage of simple abscess on left thigh-Dr. Kamlesh Maldonado       History reviewed. No pertinent family history.    Social History      Tobacco Use   • Smoking status: Some Days     Current packs/day: 0.00     Average packs/day: 0.5 packs/day for 1 year (0.5 ttl pk-yrs)     Types: Cigarettes     Start date: 2021     Last attempt to quit: 2022     Years since quittin.4     Passive exposure: Never   • Smokeless tobacco: Never   Vaping Use   • Vaping status: Never Used   Substance Use Topics   • Alcohol use: No   • Drug use: No       Review of Systems  All systems were reviewed and negative except for:   Review of Systems   Constitutional: Negative for chills, fever and unexpected weight loss.   HENT: Negative for congestion, nosebleeds and voice change.    Eyes: Negative for blurred vision, double vision and discharge.   Respiratory: Negative for apnea, chest tightness and shortness of breath.    Cardiovascular: Negative for chest pain and leg swelling.   Gastrointestinal:        See HPI   Endocrine: Negative for cold intolerance and heat intolerance.   Genitourinary: Negative for dysuria, hematuria and urgency.   Musculoskeletal: Negative for back pain, joint swelling and neck pain.   Skin: Negative for color change and dry skin.   Neurological: Negative for dizziness and confusion.   Hematological: Negative for adenopathy.   Psychiatric/Behavioral: Negative for agitation and behavioral problems.     MEDS:  Prior to Admission medications    Medication Sig Start Date End Date Taking? Authorizing Provider   ondansetron ODT (ZOFRAN-ODT) 4 MG disintegrating tablet Place 1 tablet on the tongue Every 8 (Eight) Hours As Needed for Nausea or Vomiting. 25  Yes Clara Harrison APRN   cefdinir (OMNICEF) 300 MG capsule Take 1 capsule by mouth 2 (Two) Times a Day for 10 days. 25  Chana Garner APRN   sodium-potassium-magnesium sulfates (SUPREP) 17.5-3.13-1.6 GM/177ML solution oral solution Take as directed 25   Christo Coughlin MD        Allergies:  Patient has no known allergies.    Objective    Vital Signs     "    Physical Exam:     General Appearance:    Alert, cooperative, in no acute distress   Head:    Normocephalic, without obvious abnormality, atraumatic   Eyes:          Conjunctivae and sclerae normal, no icterus,     Ears:    Ears appear intact with no abnormalities noted   Throat:   No oral lesions, no thrush, oral mucosa moist   Neck:   No adenopathy, supple, trachea midline, no thyromegaly   Back:     No kyphosis present, no scoliosis present, no skin lesions,      erythema or scars, no tenderness to percussion or                   palpation,   range of motion normal   Lungs:     Clear to auscultation,respirations regular, even and                  unlabored    Heart:    Regular rhythm and normal rate, normal S1 and S2, no            murmur, no gallop, no rub, no click   Chest Wall:    No abnormalities observed   Abdomen:     Normal bowel sounds, no masses, no organomegaly, soft        non-tender, non-distended, no guarding, no rebound                tenderness   Rectal:        Extremities:   Moves all extremities well, no edema, no cyanosis, no             redness   Pulses:   Pulses palpable and equal bilaterally   Skin:   No bleeding, bruising or rash   Lymph nodes:   No palpable adenopathy   Neurologic:   A/o x 4 with no deficits       Results Review:   {Results Review:[id number]::\"I reviewed the patient's new clinical results.\"    LABS/IMAGING:  Results for orders placed or performed in visit on 02/04/25   POCT rapid strep A    Collection Time: 02/04/25  2:49 PM    Specimen: Swab   Result Value Ref Range    Rapid Strep A Screen Positive (A) Negative, VALID, INVALID, Not Performed    Internal Control Passed Passed    Lot Number [id number]     Expiration Date 11-30-25    POCT SARS-CoV-2 Antigen NICKI + Flu    Collection Time: 02/04/25  2:56 PM    Specimen: Swab   Result Value Ref Range    SARS Antigen Not Detected Not Detected, Presumptive Negative    Influenza A Antigen NICKI Not Detected Not Detected    Influenza B " Antigen NICKI Not Detected Not Detected    Internal Control Passed Passed    Lot Number [id number]     Expiration Date 7-30-25    POCT Infectious mononucleosis antibody    Collection Time: 02/04/25  3:16 PM    Specimen: Blood   Result Value Ref Range    Monospot Negative Negative    Internal Control Passed Passed    Lot Number [id number]     Expiration Date 8-31-25         Result Review: Labs  Result Review  Imaging  Med Tab  Media     Assessment & Plan    Abnormal CT abdomen and pelvis, abdominal pain, mucus in his stool, rectal bleed    Discussion with patient.  CAT scan reviewed.  I recommended colonoscopy for further evaluation with biopsies.  Benefits and alternatives discussed.  Risk procedure including bleeding, perforation, missing a polyp discussed.  All questions answered.  He agrees with the plan.  Orders placed.  Thank you for the consult.              This document has been electronically signed by Christo Coughlin MD  February 18, 2025 14:48 EST

## 2025-02-19 ENCOUNTER — TELEPHONE (OUTPATIENT)
Dept: FAMILY MEDICINE CLINIC | Age: 40
End: 2025-02-19
Payer: COMMERCIAL

## 2025-02-19 PROBLEM — R42 CHRONIC VERTIGO: Status: RESOLVED | Noted: 2024-11-01 | Resolved: 2025-02-19

## 2025-02-19 PROBLEM — R93.5 ABNORMAL CT OF THE ABDOMEN: Status: RESOLVED | Noted: 2025-02-18 | Resolved: 2025-02-19

## 2025-02-19 NOTE — TELEPHONE ENCOUNTER
Called Dr Rodriguez's office at [facility] left detailed messsage.  Ask for a return call.

## 2025-02-19 NOTE — TELEPHONE ENCOUNTER
----- Message from Man Mckenzie sent at 2/19/2025 11:35 AM EST -----  THIS PATIENT SAW ENT (DR. CASTANO, [facility]) IN NOVEMBER FOR A MAXILLARY SINUS FRACTURE.  I HAVE REVIEWED THEIR LETTER BUT COULD NO DETERMINE IF THERE WAS ANY PLANS FOR THE FRACTURE OR TO JUST LET IT HEAL.  PLEASE ASK THAT OFFICE ABOUT THIS.  THEIR NOTES ARE IN MEDIA.  THANKS

## 2025-02-19 NOTE — TELEPHONE ENCOUNTER
Mary Carmen with [facility] called back, she said they saw pt back in Sept 2022 for a right maxillary sinus wall fracture.  Then again in November 2024 for sinus congestion.  They put him on a medrol dose pack, clindomycin and flonase nasal spray.  Told him to f/u in 2-3 weeks, if not improved he may need sinus surgery.  He did not f/u.  He needs to get a copy of his CT scan from [facility] done in Sept 2022.  He needs to call them and set up a f/u appt and take his films.

## 2025-02-20 ENCOUNTER — TELEPHONE (OUTPATIENT)
Dept: GASTROENTEROLOGY | Facility: CLINIC | Age: 40
End: 2025-02-20
Payer: COMMERCIAL

## 2025-02-20 NOTE — TELEPHONE ENCOUNTER
Attempted to contact pt to schedule colonoscopy per  request on 2/28/2025 left voicemail requesting a returned call.

## 2025-02-21 ENCOUNTER — PREP FOR SURGERY (OUTPATIENT)
Dept: OTHER | Facility: HOSPITAL | Age: 40
End: 2025-02-21
Payer: COMMERCIAL

## 2025-02-21 DIAGNOSIS — R93.5 ABNORMAL CT OF THE ABDOMEN: Primary | ICD-10-CM

## 2025-02-21 NOTE — TELEPHONE ENCOUNTER
Pt is agreeable to colonoscopy 2/28/2025. Pt is aware of instructions also sending to [employer].     Pending second sign     Pt is aware to contact  office and cancel procedure with them.

## 2025-02-27 ENCOUNTER — ANESTHESIA EVENT (OUTPATIENT)
Dept: GASTROENTEROLOGY | Facility: HOSPITAL | Age: 40
End: 2025-02-27
Payer: MEDICAID

## 2025-02-27 ENCOUNTER — TELEPHONE (OUTPATIENT)
Dept: GASTROENTEROLOGY | Facility: CLINIC | Age: 40
End: 2025-02-27
Payer: COMMERCIAL

## 2025-02-27 ENCOUNTER — TELEPHONE (OUTPATIENT)
Dept: SURGERY | Facility: CLINIC | Age: 40
End: 2025-02-27
Payer: COMMERCIAL

## 2025-02-27 NOTE — TELEPHONE ENCOUNTER
S/w pt advised we would need to reschedule procedure currently scheduled for 2/28/2025 as  is out of office,     Rescheduled for 3/5/2025.     Pt is also aware to cancel procedure with .

## 2025-02-28 ENCOUNTER — ANESTHESIA (OUTPATIENT)
Dept: GASTROENTEROLOGY | Facility: HOSPITAL | Age: 40
End: 2025-02-28
Payer: MEDICAID

## 2025-03-05 ENCOUNTER — HOSPITAL ENCOUNTER (OUTPATIENT)
Facility: HOSPITAL | Age: 40
Setting detail: HOSPITAL OUTPATIENT SURGERY
Discharge: HOME | End: 2025-03-05
Attending: INTERNAL MEDICINE | Admitting: INTERNAL MEDICINE
Payer: MEDICAID

## 2025-03-05 VITALS
HEART RATE: 57 BPM | OXYGEN SATURATION: 97 % | BODY MASS INDEX: 31.41 KG/M2 | WEIGHT: 238.1 LBS | TEMPERATURE: 96.6 F | RESPIRATION RATE: 16 BRPM | SYSTOLIC BLOOD PRESSURE: 123 MMHG | DIASTOLIC BLOOD PRESSURE: 93 MMHG

## 2025-03-05 DIAGNOSIS — R93.5 ABNORMAL CT OF THE ABDOMEN: ICD-10-CM

## 2025-03-05 PROCEDURE — 88305 TISSUE EXAM BY PATHOLOGIST: CPT | Performed by: INTERNAL MEDICINE

## 2025-03-05 PROCEDURE — 45385 COLONOSCOPY W/LESION REMOVAL: CPT | Performed by: INTERNAL MEDICINE

## 2025-03-05 PROCEDURE — 25010000002 LIDOCAINE PF 2% 2 % SOLUTION: Performed by: NURSE ANESTHETIST, CERTIFIED REGISTERED

## 2025-03-05 PROCEDURE — 25810000003 LACTATED RINGERS PER 1000 ML: Performed by: NURSE ANESTHETIST, CERTIFIED REGISTERED

## 2025-03-05 PROCEDURE — 45380 COLONOSCOPY AND BIOPSY: CPT | Performed by: INTERNAL MEDICINE

## 2025-03-05 PROCEDURE — 25010000002 PROPOFOL 10 MG/ML EMULSION: Performed by: NURSE ANESTHETIST, CERTIFIED REGISTERED

## 2025-03-05 RX ORDER — LIDOCAINE HYDROCHLORIDE 20 MG/ML
INJECTION, SOLUTION EPIDURAL; INFILTRATION; INTRACAUDAL; PERINEURAL AS NEEDED
Status: DISCONTINUED | OUTPATIENT
Start: 2025-03-05 | End: 2025-03-05 | Stop reason: SURG

## 2025-03-05 RX ORDER — SODIUM CHLORIDE, SODIUM LACTATE, POTASSIUM CHLORIDE, CALCIUM CHLORIDE 600; 310; 30; 20 MG/100ML; MG/100ML; MG/100ML; MG/100ML
30 INJECTION, SOLUTION INTRAVENOUS CONTINUOUS
Status: DISCONTINUED | OUTPATIENT
Start: 2025-03-05 | End: 2025-03-05 | Stop reason: HOSPADM

## 2025-03-05 RX ORDER — PROPOFOL 10 MG/ML
VIAL (ML) INTRAVENOUS AS NEEDED
Status: DISCONTINUED | OUTPATIENT
Start: 2025-03-05 | End: 2025-03-05 | Stop reason: SURG

## 2025-03-05 RX ADMIN — LIDOCAINE HYDROCHLORIDE 50 MG: 20 INJECTION, SOLUTION EPIDURAL; INFILTRATION; INTRACAUDAL; PERINEURAL at 09:55

## 2025-03-05 RX ADMIN — SODIUM CHLORIDE, SODIUM LACTATE, POTASSIUM CHLORIDE, AND CALCIUM CHLORIDE 30 ML/HR: .6; .31; .03; .02 INJECTION, SOLUTION INTRAVENOUS at 08:54

## 2025-03-05 RX ADMIN — PROPOFOL 150 MG: 10 INJECTION, EMULSION INTRAVENOUS at 09:55

## 2025-03-05 RX ADMIN — PROPOFOL 175 MCG/KG/MIN: 10 INJECTION, EMULSION INTRAVENOUS at 09:55

## 2025-03-05 NOTE — H&P
Pre Procedure History & Physical    Chief Complaint:   Abnormal CT, RUQ pain    Subjective     HPI:   40 yo M here for eval of abnormal CT, RUQ pain.    Past Medical History:   No past medical history on file.    Past Surgical History:  Past Surgical History:   Procedure Laterality Date    ABSCESS DRAINAGE Left 03/01/2017    Incision and drainage of simple abscess on left thigh-Dr. Kamlesh Maldonado       Family History:  No family history on file.    Social History:   reports that he has been smoking cigarettes. He started smoking about 3 years ago. He has a 0.5 pack-year smoking history. He has never been exposed to tobacco smoke. He has never used smokeless tobacco. He reports that he does not drink alcohol and does not use drugs.    Medications:   Medications Prior to Admission   Medication Sig Dispense Refill Last Dose/Taking    ondansetron ODT (ZOFRAN-ODT) 4 MG disintegrating tablet Place 1 tablet on the tongue Every 8 (Eight) Hours As Needed for Nausea or Vomiting. 30 tablet 0     sodium-potassium-magnesium sulfates (SUPREP) 17.5-3.13-1.6 GM/177ML solution oral solution Take as directed 177 mL 0        Allergies:  Patient has no known allergies.    ROS:    Pertinent items are noted in HPI     Objective     Weight 108 kg (238 lb 1.6 oz).    Physical Exam   Constitutional: Pt is oriented to person, place, and time and well-developed, well-nourished, and in no distress.   Mouth/Throat: Oropharynx is clear and moist.   Neck: Normal range of motion.   Cardiovascular: Normal rate, regular rhythm and normal heart sounds.    Pulmonary/Chest: Effort normal and breath sounds normal.   Abdominal: Soft. Nontender  Skin: Skin is warm and dry.   Psychiatric: Mood, memory, affect and judgment normal.     Assessment & Plan     Diagnosis:  Abnormal CT, RUQ pain    Anticipated Surgical Procedure:  Colonoscopy    The risks, benefits, and alternatives of this procedure have been discussed with the patient or the responsible party-  the patient understands and agrees to proceed.

## 2025-03-05 NOTE — ANESTHESIA POSTPROCEDURE EVALUATION
Patient: Sarthak Gamboa    Procedure Summary       Date: 03/05/25 Room / Location: [facility] ENDOSCOPY 4 / [facility] ENDOSCOPY    Anesthesia Start: 0951 Anesthesia Stop: 1019    Procedure: COLONOSCOPY WITH BIOPSIES, COLD SNARE POLYPECTOMY Diagnosis:       Abnormal CT of the abdomen      (Abnormal CT of the abdomen [R93.5])    Surgeons: Stacy Azevedo MD Provider: Howard Frausto CRNA    Anesthesia Type: general ASA Status: 2            Anesthesia Type: general    Vitals  Vitals Value Taken Time   /89 03/05/25 1039   Temp 35.9 °C (96.6 °F) 03/05/25 1020   Pulse 63 03/05/25 1041   Resp 16 03/05/25 1035   SpO2 95 % 03/05/25 1041   Vitals shown include unfiled device data.        Post Anesthesia Care and Evaluation    Patient location during evaluation: bedside  Patient participation: complete - patient participated  Level of consciousness: awake  Pain management: adequate    Airway patency: patent  Anesthetic complications: No anesthetic complications  PONV Status: controlled  Cardiovascular status: acceptable and stable  Respiratory status: acceptable

## 2025-03-06 LAB
CYTO UR: NORMAL
LAB AP CASE REPORT: NORMAL
LAB AP CLINICAL INFORMATION: NORMAL
PATH REPORT.FINAL DX SPEC: NORMAL
PATH REPORT.GROSS SPEC: NORMAL

## 2025-06-13 ENCOUNTER — HOSPITAL ENCOUNTER (OUTPATIENT)
Dept: GENERAL RADIOLOGY | Facility: HOSPITAL | Age: 40
Discharge: HOME | End: 2025-06-13
Payer: COMMERCIAL

## 2025-06-13 ENCOUNTER — RESULTS FOLLOW-UP (OUTPATIENT)
Dept: FAMILY MEDICINE CLINIC | Age: 40
End: 2025-06-13

## 2025-06-13 ENCOUNTER — OFFICE VISIT (OUTPATIENT)
Dept: FAMILY MEDICINE CLINIC | Age: 40
End: 2025-06-13
Payer: COMMERCIAL

## 2025-06-13 VITALS
OXYGEN SATURATION: 95 % | HEART RATE: 61 BPM | BODY MASS INDEX: 31.51 KG/M2 | DIASTOLIC BLOOD PRESSURE: 79 MMHG | WEIGHT: 237.8 LBS | TEMPERATURE: 97.8 F | HEIGHT: 73 IN | SYSTOLIC BLOOD PRESSURE: 142 MMHG

## 2025-06-13 DIAGNOSIS — M25.522 LEFT ELBOW PAIN: Primary | ICD-10-CM

## 2025-06-13 DIAGNOSIS — M89.8X2: ICD-10-CM

## 2025-06-13 PROCEDURE — 73070 X-RAY EXAM OF ELBOW: CPT

## 2025-06-13 PROCEDURE — 99213 OFFICE O/P EST LOW 20 MIN: CPT

## 2025-06-13 NOTE — PROGRESS NOTES
Subjective     CHIEF COMPLAINT    Chief Complaint   Patient presents with    Elbow Injury     (L), x 2 days. Tingling.      History of Present Illness:  Sarthak Gamboa is a 39 y.o. male who presents to [facility] with acute complaint of left elbow pain.  This is located to the medial aspect of his left elbow.  No known injury.  States that he woke up 2 days ago and had difficulty moving it.  Since then his mobility has improved but he still having pain.  He is right-handed.  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  No numbness or weakness.  Has not taken anything at home for symptoms. No redness warmth or other associated symptoms.  Some tingling in the area.       Review of Systems   Constitutional:  Negative for chills and fever.   Respiratory:  Negative for shortness of breath and wheezing.    Cardiovascular:  Negative for chest pain.   Musculoskeletal:  Positive for arthralgias.   Skin:  Negative for color change, rash and wound.         Past Medical History:   Diagnosis Date    Hypertension          Past Surgical History:   Procedure Laterality Date    ABSCESS DRAINAGE Left 2017    Incision and drainage of simple abscess on left thigh-Dr. Kamlesh Maldonado    COLONOSCOPY N/A 3/5/2025    Procedure: COLONOSCOPY WITH BIOPSIES, COLD SNARE POLYPECTOMY;  Surgeon: Stacy Azevedo MD;  Location: [facility] ENDOSCOPY;  Service: Gastroenterology;  Laterality: N/A;  COLON POLYP, DIVERTICULOSIS, HEMORRHOIDS         History reviewed. No pertinent family history.      Social History     Socioeconomic History    Marital status: Single   Tobacco Use    Smoking status: Some Days     Current packs/day: 0.00     Average packs/day: 0.5 packs/day for 1 year (0.5 ttl pk-yrs)     Types: Cigarettes     Start date: 2021     Last attempt to quit: 2022     Years since quittin.7     Passive exposure: Never    Smokeless tobacco: Never   Vaping Use    Vaping status: Never Used   Substance and  "Sexual Activity    Alcohol use: No    Drug use: No    Sexual activity: Defer         No Known Allergies       Current Outpatient Medications on File Prior to Visit   Medication Sig Dispense Refill    [DISCONTINUED] ondansetron ODT (ZOFRAN-ODT) 4 MG disintegrating tablet Place 1 tablet on the tongue Every 8 (Eight) Hours As Needed for Nausea or Vomiting. (Patient not taking: Reported on 6/13/2025) 30 tablet 0     No current facility-administered medications on file prior to visit.     /79   Pulse 61   Temp 97.8 °F (36.6 °C) (Oral)   Ht 185.4 cm (72.99\")   Wt 108 kg (237 lb 12.8 oz)   SpO2 95% Comment: room air  BMI 31.38 kg/m²       Objective     Physical Exam  Vitals and nursing note reviewed.   Constitutional:       General: He is not in acute distress.     Appearance: Normal appearance. He is not ill-appearing.   Pulmonary:      Effort: Pulmonary effort is normal. No respiratory distress.   Musculoskeletal:      Left elbow: No swelling, deformity or effusion. Normal range of motion. Tenderness present in medial epicondyle.   Skin:     General: Skin is warm and dry.      Capillary Refill: Capillary refill takes less than 2 seconds.   Neurological:      General: No focal deficit present.      Mental Status: He is alert and oriented to person, place, and time.   Psychiatric:         Mood and Affect: Mood and affect normal.         Behavior: Behavior normal.         Assessment & Plan  Left elbow pain  Possible medial epicondylitis, but will check x ray today as a precaution. Recommend rest, ice, elevation, OTC Tylenol/Ibuprofen. Also discussed use of brace.     Orders:    XR Elbow 2 View Left (In Office)           Follow up:  Return if symptoms worsen or fail to improve.  Patient was given instructions and counseling regarding his condition or for health maintenance advice. Please see specific information pulled into the AVS if appropriate.   "

## 2025-06-17 ENCOUNTER — TELEPHONE (OUTPATIENT)
Age: 40
End: 2025-06-17
Payer: COMMERCIAL

## 2025-06-17 NOTE — TELEPHONE ENCOUNTER
LEFT VOICEMAIL FOR PATIENT WE NEED  TO RESCHEDULE HIS APPOINTMENT FOR 06/18/2025 AS PROVIDER WILL BE OUT.      Ok for HUB to relay message    wnt

## 2025-06-19 ENCOUNTER — OFFICE VISIT (OUTPATIENT)
Dept: FAMILY MEDICINE CLINIC | Age: 40
End: 2025-06-19
Payer: COMMERCIAL

## 2025-06-19 VITALS
WEIGHT: 239.6 LBS | OXYGEN SATURATION: 97 % | HEART RATE: 68 BPM | HEIGHT: 73 IN | SYSTOLIC BLOOD PRESSURE: 146 MMHG | DIASTOLIC BLOOD PRESSURE: 89 MMHG | TEMPERATURE: 98 F | BODY MASS INDEX: 31.75 KG/M2

## 2025-06-19 DIAGNOSIS — M25.522 LEFT ELBOW PAIN: Primary | ICD-10-CM

## 2025-06-19 PROCEDURE — 99214 OFFICE O/P EST MOD 30 MIN: CPT | Performed by: FAMILY MEDICINE

## 2025-06-19 NOTE — LETTER
June 19, 2025     Patient: Sarthak Gamboa   YOB: 1985   Date of Visit: 6/19/2025       To Whom It May Concern:    It is my medical opinion that Sarthak Gamboa may return to work in three days.            Sincerely,        Christo Huggins MD    CC: No Recipients

## 2025-06-19 NOTE — ASSESSMENT & PLAN NOTE
Orders:    Diclofenac Sodium (VOLTAREN) 1 % gel gel; Apply 4 g topically to the appropriate area as directed 2 (Two) Times a Day As Needed (elbow pain).

## 2025-06-19 NOTE — PROGRESS NOTES
Chief Complaint  Elbow Pain (left)    Patient or patient representative verbalized consent for the use of Ambient Listening during the visit with  Christo Huggins MD for chart documentation. 6/19/2025  15:37 EDT    Subjective          Sarthak Gamboa presents to [facility]  History of Present Illness    History of Present Illness  The patient is a 39-year-old male who presents for evaluation of elbow pain and elevated blood pressure.    He has been experiencing discomfort in his elbow for several weeks, with no recollection of any specific incident that could have caused the injury. The pain was so severe one morning that he was unable to move his arm, although it improved slightly the following day. Currently, he can move his arm but experiences pain when attempting full extension or flexion. He was referred to an orthopedic specialist this morning but due to an emergency, his appointment has been rescheduled for Monday. He is seeking advice on how to manage his condition until then. 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 He has been trying to rest his arm for the past 2 weeks without any improvement. The pain is severe enough to disrupt his sleep, causing him to wake up with a tingling sensation in his arm. He has not been prescribed any anti-inflammatory medication but has been self-medicating with Tylenol and ibuprofen, which have provided minimal relief. He has also tried using Biofreeze gel. He is requesting a note for work today and Monday.    He reports occasional high blood pressure readings, which he attributes to anxiety. He does not own a blood pressure monitor at home. He also reports snoring. He has a history of vertigo and dizziness.    He consumes alcohol on weekends, although not as heavily as in the past.    SOCIAL HISTORY  He drinks alcohol on the weekends.    FAMILY HISTORY  He reports no family history of high blood  "pressure.            No current outpatient medications on file prior to visit.     No current facility-administered medications on file prior to visit.       Review of Systems     Social History     Tobacco Use   Smoking Status Some Days    Current packs/day: 0.00    Average packs/day: 0.5 packs/day for 1 year (0.5 ttl pk-yrs)    Types: Cigarettes    Start date: 2021    Last attempt to quit: 2022    Years since quittin.8    Passive exposure: Never   Smokeless Tobacco Never        Objective   Vital Signs:   Ht 185.4 cm (72.99\")   BMI 31.38 kg/m²   BP Readings from Last 3 Encounters:   25 142/79   25 123/93   25 143/90      Physical Exam     Physical Exam  Pleasant gentleman no acute distress  Color is good  Eyes are nonicteric  Heart is regular rate and rhythm no murmur  Lungs are bilaterally clear  Extremities: Both medial and lateral epicondyles of the elbow are tender. Supination and pronation cause pain on the medial aspect of the elbow. Hyperextension of the wrist causes pain in the medial elbow. Flexion and extension of the wrist against resistance cause discomfort in the medial elbow.        Result Review :       Results  Labs   - Kidney function test: 2025, Normal   - Liver function test: 2025, Slightly elevated   - Liver function test: 2024, Slightly elevated   - Blood glucose test: 2025, Slightly high   - Blood glucose test: 2024, Normal    Imaging   - X-ray of the elbow: Small overgrowth of bone, called an exostosis, which is nonaggressive appearing                  Assessment and Plan    Assessment & Plan  Left elbow pain    Orders:    Diclofenac Sodium (VOLTAREN) 1 % gel gel; Apply 4 g topically to the appropriate area as directed 2 (Two) Times a Day As Needed (elbow pain).        Assessment & Plan  1. Medial epicondylitis.  - Symptoms suggest a diagnosis of medial epicondylitis, likely due to overuse from his occupation as a .  - Increased " pain and limited mobility.  - An x-ray revealed a small exostosis on the humerus, which may or may not be related to his symptoms.  - A prescription for diclofenac gel has been sent to pharmacy. Over-the-counter options such as Voltaren or Aleve 1 tablet twice daily with meals are also recommended if the pain becomes severe. A work note has been provided for today and Monday. He is scheduled to see an orthopedic specialist on 06/23/2025 for further evaluation and potential treatment, including possible splinting.    2. Elevated blood pressure.  - Blood pressure readings have been consistently elevated during his last 4 to 5 visits.  - Borderline elevated blood pressure today.  - Regular monitoring of his blood pressure is recommended, either by visiting the clinic once or twice a week or by purchasing a home blood pressure monitor and maintaining a log of his readings.  - If his blood pressure remains high, Dr. Krishnamurthy may consider initiating antihypertensive medication and possibly conducting a sleep study to rule out sleep apnea.    3. Elevated liver enzymes.  - Elevated liver enzymes noted in 02/2025 and 12/2024.  - Elevated liver enzymes and blood sugar in February.  - He is advised to reduce alcohol consumption, which may be contributing to the elevated liver enzymes.  - Regular monitoring and follow-up recommended.        Follow Up   No follow-ups on file.  Patient was given instructions and counseling regarding his condition or for health maintenance advice. Please see specific information pulled into the AVS if appropriate.

## 2025-06-23 ENCOUNTER — TELEPHONE (OUTPATIENT)
Age: 40
End: 2025-06-23

## 2025-06-23 NOTE — TELEPHONE ENCOUNTER
Attempted to reach patient regarding missed appointment on 06/23/2025 and to reschedule appointment.  No answer; left voicemail.  wnt

## 2025-07-10 ENCOUNTER — TELEPHONE (OUTPATIENT)
Dept: FAMILY MEDICINE CLINIC | Age: 40
End: 2025-07-10
Payer: COMMERCIAL

## 2025-07-10 NOTE — TELEPHONE ENCOUNTER
Called pt because he no-showed his appt on 07/09/25. No answer, left VM explaining our no-show policy. Letter was sent.

## 2025-07-16 PROBLEM — S02.40CA: Status: RESOLVED | Noted: 2024-11-01 | Resolved: 2025-07-16

## 2025-07-16 PROBLEM — Z86.0100 HISTORY OF COLON POLYPS: Status: ACTIVE | Noted: 2025-07-16

## 2025-07-16 PROBLEM — R93.5 ABNORMAL CT OF THE ABDOMEN: Status: RESOLVED | Noted: 2025-02-21 | Resolved: 2025-07-16

## 2025-07-16 PROBLEM — R10.11 RIGHT UPPER QUADRANT PAIN: Status: RESOLVED | Noted: 2025-02-17 | Resolved: 2025-07-16

## 2025-07-16 PROBLEM — R93.5 ABNORMAL ABDOMINAL CT SCAN: Status: RESOLVED | Noted: 2025-02-17 | Resolved: 2025-07-16

## 2025-07-24 ENCOUNTER — TELEPHONE (OUTPATIENT)
Dept: FAMILY MEDICINE CLINIC | Age: 40
End: 2025-07-24
Payer: COMMERCIAL

## 2025-07-24 NOTE — TELEPHONE ENCOUNTER
Spoke with veronica about pt no show 07/17  she stated that she would let him know and that he would call back to schedule./al

## 2025-07-29 ENCOUNTER — OFFICE VISIT (OUTPATIENT)
Dept: FAMILY MEDICINE CLINIC | Age: 40
End: 2025-07-29
Payer: COMMERCIAL

## 2025-07-29 VITALS
OXYGEN SATURATION: 98 % | HEART RATE: 67 BPM | BODY MASS INDEX: 30.88 KG/M2 | HEIGHT: 73 IN | SYSTOLIC BLOOD PRESSURE: 139 MMHG | DIASTOLIC BLOOD PRESSURE: 99 MMHG | TEMPERATURE: 97.6 F | WEIGHT: 233 LBS

## 2025-07-29 DIAGNOSIS — A08.4 VIRAL GASTROENTERITIS: Primary | ICD-10-CM

## 2025-07-29 PROCEDURE — 99213 OFFICE O/P EST LOW 20 MIN: CPT | Performed by: NURSE PRACTITIONER

## 2025-07-29 RX ORDER — PROMETHAZINE HYDROCHLORIDE 25 MG/1
25 TABLET ORAL NIGHTLY PRN
Qty: 20 TABLET | Refills: 0 | Status: SHIPPED | OUTPATIENT
Start: 2025-07-29

## 2025-07-29 RX ORDER — ONDANSETRON 4 MG/1
4 TABLET, FILM COATED ORAL EVERY 8 HOURS PRN
Qty: 30 TABLET | Refills: 0 | Status: SHIPPED | OUTPATIENT
Start: 2025-07-29

## 2025-07-29 NOTE — LETTER
July 29, 2025     Patient: Sarthak Gamboa   YOB: 1985   Date of Visit: 7/29/2025       To Whom It May Concern:    It is my medical opinion that Sarthak Gamboa may be excused from work the following dates: 7/28/25 - 7/30/25 . He may return to full duty on 7/31/25.          Sincerely,        MIL Gastelum    CC: No Recipients

## 2025-07-29 NOTE — PROGRESS NOTES
"Please note that portions of this document were completed using a voice recognition program.     Patient or patient representative verbalized consent for the use of Ambient Listening during the visit with  MIL Gastelum for chart documentation. 7/29/2025  14:22 EDT    Chief Complaint  Abdominal Pain (X3 days), Nausea (X3 days), Diarrhea (X3 days), and Vomiting (X3 days)    Subjective        Sarthak Gamboa presents to [facility] today for       History of Present Illness  The patient is a 39-year-old male who presents for nausea, vomiting, and diarrhea.    He began experiencing severe stomach pain around 2:00 AM, which was followed by episodes of vomiting and diarrhea. He also reports hot flashes and sweating but has not had a fever. He does not have any symptoms of indigestion or heartburn. His energy levels are significantly reduced. He suspects that his symptoms may be due to a viral infection, as his 6-year-old son recently fell ill with similar symptoms, started after eating at [employer] on Sunday           Current Outpatient Medications:     Diclofenac Sodium (VOLTAREN) 1 % gel gel, Apply 4 g topically to the appropriate area as directed 2 (Two) Times a Day As Needed (elbow pain)., Disp: 100 g, Rfl: 5    ondansetron (Zofran) 4 MG tablet, Take 1 tablet by mouth Every 8 (Eight) Hours As Needed for Nausea or Vomiting., Disp: 30 tablet, Rfl: 0    promethazine (PHENERGAN) 25 MG tablet, Take 1 tablet by mouth At Night As Needed for Nausea or Vomiting., Disp: 20 tablet, Rfl: 0  There are no discontinued medications.      Allergies:  Patient has no known allergies.      Objective   Vital Signs:   Vitals:    07/29/25 1418   BP: 139/99   BP Location: Right arm   Patient Position: Sitting   Cuff Size: Large Adult   Pulse: 67   Temp: 97.6 °F (36.4 °C)   TempSrc: Oral   SpO2: 98%   Weight: 106 kg (233 lb)   Height: 185.4 cm (72.99\")     Body mass index is 30.75 kg/m².   "         Physical Exam  Constitutional:       Appearance: Normal appearance.   Neck:      Vascular: No carotid bruit.   Cardiovascular:      Rate and Rhythm: Normal rate and regular rhythm.      Heart sounds: Normal heart sounds.   Pulmonary:      Effort: Pulmonary effort is normal.      Breath sounds: Normal breath sounds.   Musculoskeletal:         General: Normal range of motion.   Skin:     General: Skin is warm and dry.   Neurological:      General: No focal deficit present.      Mental Status: He is alert.   Psychiatric:         Mood and Affect: Mood normal.         Behavior: Behavior normal.             Lab Results   Component Value Date    GLUCOSE 101 (H) 03/19/2019    BUN 14 03/19/2019    CREATININE 0.94 03/19/2019    BCR 15 03/19/2019    K 4.2 03/19/2019    CO2 24 03/19/2019    CALCIUM 9.4 03/19/2019    ALBUMIN 4.4 03/19/2019    AST 48 03/19/2019    ALT 95 (H) 03/19/2019       Lab Results   Component Value Date    CHOL 155 07/05/2022    TRIG 101 07/05/2022    HDL 47 07/05/2022    LDL 89 07/05/2022                       Procedures         Diagnoses and all orders for this visit:    1. Viral gastroenteritis (Primary)  -     ondansetron (Zofran) 4 MG tablet; Take 1 tablet by mouth Every 8 (Eight) Hours As Needed for Nausea or Vomiting.  Dispense: 30 tablet; Refill: 0  -     promethazine (PHENERGAN) 25 MG tablet; Take 1 tablet by mouth At Night As Needed for Nausea or Vomiting.  Dispense: 20 tablet; Refill: 0  -     CBC w AUTO Differential; Future          Assessment & Plan  1. Nausea, vomiting, and diarrhea.  - Symptoms suggest a possible viral infection, given the current prevalence of stomach viruses.  - Physical exam findings indicate lungs and heart are normal.  - Complete blood count (CBC) will be conducted to rule out any bacterial infections.  - Prescriptions for Zofran 4 mg every 8 hours as needed for nausea and promethazine 25 mg at night as needed for nausea or sleep aid have been provided. Advised  to take Tylenol for fever management and to maintain hydration with clear fluids, avoiding high-sugar beverages due to their potential to exacerbate diarrhea. A work note will be issued for yesterday, today, and tomorrow.              Follow Up  Return for If not improving or symptoms are getting worse.  Patient was given instructions and counseling regarding his condition or for health maintenance advice. Please see specific information pulled into the AVS if appropriate.           Carolyn Singh, APRN  07/29/2025

## 2025-08-01 ENCOUNTER — OFFICE VISIT (OUTPATIENT)
Dept: FAMILY MEDICINE CLINIC | Age: 40
End: 2025-08-01
Payer: COMMERCIAL

## 2025-08-01 VITALS
WEIGHT: 231.4 LBS | HEART RATE: 71 BPM | OXYGEN SATURATION: 97 % | HEIGHT: 73 IN | DIASTOLIC BLOOD PRESSURE: 88 MMHG | BODY MASS INDEX: 30.67 KG/M2 | TEMPERATURE: 98 F | SYSTOLIC BLOOD PRESSURE: 145 MMHG

## 2025-08-01 DIAGNOSIS — A08.4 VIRAL GASTROENTERITIS: Primary | ICD-10-CM

## 2025-08-01 PROCEDURE — 99213 OFFICE O/P EST LOW 20 MIN: CPT | Performed by: NURSE PRACTITIONER

## 2025-08-01 RX ORDER — DIPHENOXYLATE HYDROCHLORIDE AND ATROPINE SULFATE 2.5; .025 MG/1; MG/1
1 TABLET ORAL 2 TIMES DAILY PRN
Qty: 8 TABLET | Refills: 0 | Status: SHIPPED | OUTPATIENT
Start: 2025-08-01

## 2025-08-01 NOTE — PROGRESS NOTES
Please note that portions of this document were completed using a voice recognition program.     Patient or patient representative verbalized consent for the use of Ambient Listening during the visit with  MIL Gastelum for chart documentation. 8/1/2025  13:58 EDT    Chief Complaint  Nausea (Nausea- going on since last visit), Diarrhea, and Fatigue (Weak/fatigue )    Subjective        Sarthak Gamboa presents to [facility] today for       History of Present Illness  The patient is a 39-year-old male here for follow-up on nausea, diarrhea, and fatigue.    He continues to experience fatigue and has been monitoring his temperature, reporting no fever. He also reports hot flashes. His symptoms have worsened today, with the first instance of him consuming soft rice. He has not been taking any medication for his diarrhea as it has not been severe. He has lost a few pounds recently. He was prescribed medication for nausea, which he plans to collect from [facility].    He was unable to attend his physical examination last week due to work commitments. He does not have an appointment scheduled with Dr. Mckenzie.    Social History:  Occupations: Works at a [employer]    FAMILY HISTORY  His father and grandmother have high blood pressure.          Current Outpatient Medications:     Diclofenac Sodium (VOLTAREN) 1 % gel gel, Apply 4 g topically to the appropriate area as directed 2 (Two) Times a Day As Needed (elbow pain)., Disp: 100 g, Rfl: 5    ondansetron (Zofran) 4 MG tablet, Take 1 tablet by mouth Every 8 (Eight) Hours As Needed for Nausea or Vomiting., Disp: 30 tablet, Rfl: 0    promethazine (PHENERGAN) 25 MG tablet, Take 1 tablet by mouth At Night As Needed for Nausea or Vomiting., Disp: 20 tablet, Rfl: 0    diphenoxylate-atropine (Lomotil) 2.5-0.025 MG per tablet, Take 1 tablet by mouth 2 (Two) Times a Day As Needed for Diarrhea., Disp: 8 tablet, Rfl: 0  There are no  "discontinued medications.      Allergies:  Patient has no known allergies.      Objective   Vital Signs:   Vitals:    08/01/25 1355 08/01/25 1404   BP: 142/92 145/88   BP Location: Left arm Left arm   Patient Position: Sitting    Cuff Size: Adult    Pulse: 71    Temp: 98 °F (36.7 °C)    TempSrc: Oral    SpO2: 97%    Weight: 105 kg (231 lb 6.4 oz)    Height: 185.4 cm (72.99\")      Body mass index is 30.54 kg/m².           Physical Exam  Constitutional:       Appearance: Normal appearance.   Neck:      Vascular: No carotid bruit.   Cardiovascular:      Rate and Rhythm: Normal rate and regular rhythm.      Heart sounds: Normal heart sounds.   Pulmonary:      Effort: Pulmonary effort is normal.      Breath sounds: Normal breath sounds.   Musculoskeletal:         General: Normal range of motion.   Skin:     General: Skin is warm and dry.   Neurological:      General: No focal deficit present.      Mental Status: He is alert.   Psychiatric:         Mood and Affect: Mood normal.         Behavior: Behavior normal.           Lab Results   Component Value Date    GLUCOSE 101 (H) 03/19/2019    BUN 14 03/19/2019    CREATININE 0.94 03/19/2019    BCR 15 03/19/2019    K 4.2 03/19/2019    CO2 24 03/19/2019    CALCIUM 9.4 03/19/2019    ALBUMIN 4.4 03/19/2019    AST 48 03/19/2019    ALT 95 (H) 03/19/2019       Lab Results   Component Value Date    CHOL 155 07/05/2022    TRIG 101 07/05/2022    HDL 47 07/05/2022    LDL 89 07/05/2022                         Procedures         Diagnoses and all orders for this visit:    1. Viral gastroenteritis (Primary)  -     diphenoxylate-atropine (Lomotil) 2.5-0.025 MG per tablet; Take 1 tablet by mouth 2 (Two) Times a Day As Needed for Diarrhea.  Dispense: 8 tablet; Refill: 0          Assessment & Plan  1. Nausea.  - Reports ongoing nausea.  - Medication for nausea prescribed previously.  - Advised to  the medication from the pharmacy.  - No new medication prescribed for nausea today.    2. " Diarrhea.  - Continues to experience diarrhea.  - Reports weight loss of a couple of pounds.  - Prescription for a controlled substance to be taken twice daily as needed for diarrhea will be sent to the pharmacy.  - No other treatments discussed for diarrhea.    3. Fatigue.  - Reports persistent fatigue.  - No specific treatment discussed during this visit.  - No new medications or therapies prescribed for fatigue.  - Monitoring ongoing symptoms.    4. Elevated blood pressure.  - Blood pressure recorded at 145/88 today.  - Suggested a baby dose of medication but decided not to start it while unwell.  - Scheduled for an appointment with Dr. Mckenzie for further evaluation.  - Family history of high blood pressure noted.    Follow-up: Scheduled appointment with Dr. Mckenzie.              Follow Up  Return in about 2 months (around 10/1/2025) for Annual physical.  Patient was given instructions and counseling regarding his condition or for health maintenance advice. Please see specific information pulled into the AVS if appropriate.           Carolyn Singh, MIL  08/01/2025

## 2025-08-11 ENCOUNTER — OFFICE VISIT (OUTPATIENT)
Dept: FAMILY MEDICINE CLINIC | Age: 40
End: 2025-08-11
Payer: COMMERCIAL

## 2025-08-11 VITALS
OXYGEN SATURATION: 96 % | HEIGHT: 73 IN | HEART RATE: 76 BPM | DIASTOLIC BLOOD PRESSURE: 88 MMHG | SYSTOLIC BLOOD PRESSURE: 140 MMHG | BODY MASS INDEX: 30.75 KG/M2 | WEIGHT: 232 LBS | TEMPERATURE: 98 F

## 2025-08-11 DIAGNOSIS — R03.0 ELEVATED BLOOD PRESSURE READING: ICD-10-CM

## 2025-08-11 DIAGNOSIS — F41.9 ANXIETY: ICD-10-CM

## 2025-08-11 DIAGNOSIS — F32.A DEPRESSION, UNSPECIFIED DEPRESSION TYPE: Primary | ICD-10-CM

## 2025-08-11 RX ORDER — BUSPIRONE HYDROCHLORIDE 5 MG/1
5 TABLET ORAL 3 TIMES DAILY PRN
Qty: 90 TABLET | Refills: 1 | Status: SHIPPED | OUTPATIENT
Start: 2025-08-11

## 2025-08-26 ENCOUNTER — TELEPHONE (OUTPATIENT)
Dept: FAMILY MEDICINE CLINIC | Age: 40
End: 2025-08-26
Payer: COMMERCIAL

## 2025-08-26 ENCOUNTER — OFFICE VISIT (OUTPATIENT)
Dept: FAMILY MEDICINE CLINIC | Age: 40
End: 2025-08-26
Payer: COMMERCIAL

## 2025-08-26 VITALS
SYSTOLIC BLOOD PRESSURE: 139 MMHG | BODY MASS INDEX: 30.75 KG/M2 | HEART RATE: 62 BPM | TEMPERATURE: 98.3 F | DIASTOLIC BLOOD PRESSURE: 87 MMHG | OXYGEN SATURATION: 96 % | WEIGHT: 232 LBS | HEIGHT: 73 IN | RESPIRATION RATE: 20 BRPM

## 2025-08-26 DIAGNOSIS — R09.89 CHEST CONGESTION: ICD-10-CM

## 2025-08-26 DIAGNOSIS — R09.81 NASAL CONGESTION: ICD-10-CM

## 2025-08-26 DIAGNOSIS — J02.9 SORE THROAT: ICD-10-CM

## 2025-08-26 DIAGNOSIS — R05.1 ACUTE COUGH: ICD-10-CM

## 2025-08-26 DIAGNOSIS — J06.9 VIRAL URI: Primary | ICD-10-CM

## 2025-08-26 PROCEDURE — 87081 CULTURE SCREEN ONLY: CPT | Performed by: STUDENT IN AN ORGANIZED HEALTH CARE EDUCATION/TRAINING PROGRAM

## 2025-08-26 PROCEDURE — 87428 SARSCOV & INF VIR A&B AG IA: CPT | Performed by: STUDENT IN AN ORGANIZED HEALTH CARE EDUCATION/TRAINING PROGRAM

## 2025-08-26 PROCEDURE — 99213 OFFICE O/P EST LOW 20 MIN: CPT | Performed by: STUDENT IN AN ORGANIZED HEALTH CARE EDUCATION/TRAINING PROGRAM

## 2025-08-26 PROCEDURE — 87880 STREP A ASSAY W/OPTIC: CPT | Performed by: STUDENT IN AN ORGANIZED HEALTH CARE EDUCATION/TRAINING PROGRAM

## 2025-08-26 RX ORDER — FLUTICASONE PROPIONATE 50 MCG
1 SPRAY, SUSPENSION (ML) NASAL 2 TIMES DAILY
Qty: 16 G | Refills: 0 | Status: SHIPPED | OUTPATIENT
Start: 2025-08-26

## 2025-08-26 RX ORDER — BROMPHENIRAMINE MALEATE, PSEUDOEPHEDRINE HYDROCHLORIDE, AND DEXTROMETHORPHAN HYDROBROMIDE 2; 30; 10 MG/5ML; MG/5ML; MG/5ML
5 SYRUP ORAL 4 TIMES DAILY PRN
Qty: 240 ML | Refills: 0 | Status: SHIPPED | OUTPATIENT
Start: 2025-08-26

## 2025-08-26 RX ORDER — GUAIFENESIN 600 MG/1
1200 TABLET, EXTENDED RELEASE ORAL 2 TIMES DAILY
Qty: 40 TABLET | Refills: 0 | Status: SHIPPED | OUTPATIENT
Start: 2025-08-26

## 2025-08-28 ENCOUNTER — TELEPHONE (OUTPATIENT)
Dept: FAMILY MEDICINE CLINIC | Age: 40
End: 2025-08-28
Payer: COMMERCIAL

## 2025-08-28 LAB — BACTERIA SPEC AEROBE CULT: NORMAL

## (undated) DEVICE — Device

## (undated) DEVICE — SOL IRRG H2O PL/BG 1000ML STRL

## (undated) DEVICE — CONN JET HYDRA H20 AUXILIARY DISP

## (undated) DEVICE — SOLIDIFIER LIQLOC PLS 1500CC BT

## (undated) DEVICE — SINGLE-USE BIOPSY FORCEPS: Brand: RADIAL JAW 4

## (undated) DEVICE — THE SINGLE USE ETRAP – POLYP TRAP IS USED FOR SUCTION RETRIEVAL OF ENDOSCOPICALLY REMOVED POLYPS.: Brand: ETRAP

## (undated) DEVICE — LINER SURG CANSTR SXN S/RIGD 1500CC

## (undated) DEVICE — DEFENDO AIR WATER SUCTION AND BIOPSY VALVE KIT FOR  OLYMPUS: Brand: DEFENDO AIR/WATER/SUCTION AND BIOPSY VALVE

## (undated) DEVICE — SNAR POLYP CAPTIFLEX XS/OVL 11X2.4MM 240CM 1P/U